# Patient Record
Sex: FEMALE | HISPANIC OR LATINO | Employment: OTHER | URBAN - METROPOLITAN AREA
[De-identification: names, ages, dates, MRNs, and addresses within clinical notes are randomized per-mention and may not be internally consistent; named-entity substitution may affect disease eponyms.]

---

## 2018-01-11 NOTE — MISCELLANEOUS
Message   Recorded as Task   Date: 12/04/2016 07:13 PM, Created By: Maddi Barber   Task Name: Call Patient with results   Assigned To: Maddi Barber   Regarding Patient: Gwendolyn Velasquez, Status: Complete   Comment:    Maddi Barber - 04 Dec 2016 7:13 PM     Patient Phone: [phone]    looks ok, benign cyst   Sun Mess - 08 Dec 2016 3:36 PM     TASK COMPLETED   Advised pt's son of results, stated below, per Dr Milo wren      Active Problems    1  Anxiety (300 00) (F41 9)   2  Bursitis (727 3) (M71 9)   3  Dementia without behavioral disturbance (294 20) (F03 90)   4  Depression (311) (F32 9)   5  Depression with anxiety (300 4) (F41 8)   6  Gallbladder disease (575 9) (K82 9)   7  Generalized osteoarthritis (715 00) (M15 9)   8  Hip pain, unspecified laterality   9  Idiopathic osteoporosis (733 02) (M81 8)   10  Insomnia (780 52) (G47 00)   11  Insomnia (780 52) (G47 00)   12  Kyphoscoliosis (737 30) (M41 9)   13  Lower back pain (724 2) (M54 5)   14  Lower back pain (724 2) (M54 5)   15  Osteoporosis (733 00) (M81 0)   16  Sebaceous cyst (706 2) (L72 3)   17  Subdural hematoma (432 1) (I62 00)   18  Trochanteric bursitis, unspecified laterality (726 5) (M70 60)   19  Urinary tract infection (599 0) (N39 0)    Current Meds   1  Alendronate Sodium 35 MG Oral Tablet; TAKE 1 TABLET WEEKLY ON AN EMPTY   STOMACH 30-60 MINUTES BEFORE BREAKFAST WITH 8-12 OUNCES OF WATER  DO NOT LIE DOWN AFTER TAKING PILL; Therapy: 02Xlt0388 to (Last Rx:21Rfa9691)  Requested for: 23Xxy9551 Ordered   2  Donepezil HCl - 5 MG Oral Tablet; TAKE 1 TABLET DAILY AS DIRECTED; Therapy: 59Qhf7646 to (Evaluate:80Vie0903)  Requested for: 28Fft1048; Last   Rx:84Gpc3721 Ordered   3  Flurazepam HCl - 15 MG Oral Capsule; TAKE 1 CAPSULE AT BEDTIME AS NEEDED; Therapy: 28WXK7737 to (Evaluate:36Ncf4113); Last Rx:13Mar2013 Ordered   4   Hydrocodone-Acetaminophen 5-500 MG Oral Tablet; TAKE 1 TABLET TWICE DAILY AS   NEEDED FOR PAIN; Therapy: 73DKJ1424 to (Ferdinand Van)  Requested for: 16DJJ3831; Last   Rx:30Nov2012 Ordered   5  Neurontin 100 MG Oral Capsule (Gabapentin); Therapy: (Juventino Ferrell) to Recorded   6  Zolpidem Tartrate 5 MG Oral Tablet; TAKE 1 TABLET AT  BEDTIME AS NEEDED FOR   INSOMNIA; Therapy: 15Apr2013 to (Evaluate:14Jun2013); Last Rx:15Apr2013 Ordered    Allergies    1  Codeine Derivatives   2   Codeine Sulfate TABS    Signatures   Electronically signed by : Whitney Ornelas, ; Dec  9 2016  2:38PM EST                       (Author)

## 2018-01-15 NOTE — RESULT NOTES
Verified Results  (1) TISSUE EXAM [id number] 02:30PM Rodney Rojo     Test Name Result Flag Reference   LAB AP CASE REPORT (Report)     Surgical Pathology Report             Case: [id number]                   Authorizing Provider: Nena Cardenas MD    Collected:      11/23/2016 1430        Pathologist:      Sherin Lees MD   Received:      11/25/2016 5358        Specimen:  Skin, Cyst/Tag/Debridement, Sebaceous cyst on right chin   LAB AP FINAL DIAGNOSIS      A  Skin, right chin, excision:  - Epithelial lined keratin filled cyst, consistent with benign epidermal   inclusion cyst     Electronically signed by Sherin Lees MD on 11/29/2016 at 12:52 PM   LAB AP SURGICAL ADDITIONAL INFORMATION (Report)     These tests were developed and their performance characteristics   determined by [facility] or [facility]  They may not be cleared or approved by the [place]  Food and   Drug Administration  The FDA has determined that such clearance or   approval is not necessary  These tests are used for clinical purposes  They should not be regarded as investigational or for research  This   laboratory has been approved by [place] 88, designated as a high-complexity   laboratory and is qualified to perform these tests  Interpretation performed at [facility], [address] [place] [address], [id number]   LAB AP GROSS DESCRIPTION (Report)     A  The specimen is received in formalin, labeled with the patient's name   and hospital number, and is designated sebaceous cyst on right chin  The specimen consists of a tan yellow fibromembranous and fibrofatty   tissue fragment measuring 1 5 cm  On one surface is unremarkable tan   fragment of skin measuring 1 2 cm  The specimen is bisected lengthwise   revealing a cystic structure within the soft tissue filled with tan-brown   caseous material  Entirely submitted  One cassette      Note: The estimated total formalin fixation time based upon information   provided by the submitting clinician and the standard processing schedule   is 54 0 hours      MAC

## 2018-01-15 NOTE — MISCELLANEOUS
Message   Recorded as Task   Date: 12/04/2016 07:13 PM, Created By: Brynn Anders   Task Name: Call Patient with results   Assigned To: Brynn Anders   Regarding Patient: Alcus Dance, Status: Active   Comment:    Brynn Anders - 04 Dec 2016 7:13 PM     Patient Phone: [phone]    looks ok, benign cyst   Left message r/c/b  Active Problems    1  Anxiety (300 00) (F41 9)   2  Bursitis (727 3) (M71 9)   3  Dementia without behavioral disturbance (294 20) (F03 90)   4  Depression (311) (F32 9)   5  Depression with anxiety (300 4) (F41 8)   6  Gallbladder disease (575 9) (K82 9)   7  Generalized osteoarthritis (715 00) (M15 9)   8  Hip pain, unspecified laterality   9  Idiopathic osteoporosis (733 02) (M81 8)   10  Insomnia (780 52) (G47 00)   11  Insomnia (780 52) (G47 00)   12  Kyphoscoliosis (737 30) (M41 9)   13  Lower back pain (724 2) (M54 5)   14  Lower back pain (724 2) (M54 5)   15  Osteoporosis (733 00) (M81 0)   16  Sebaceous cyst (706 2) (L72 3)   17  Subdural hematoma (432 1) (I62 00)   18  Trochanteric bursitis, unspecified laterality (726 5) (M70 60)   19  Urinary tract infection (599 0) (N39 0)    Current Meds   1  Alendronate Sodium 35 MG Oral Tablet; TAKE 1 TABLET WEEKLY ON AN EMPTY   STOMACH 30-60 MINUTES BEFORE BREAKFAST WITH 8-12 OUNCES OF WATER  DO NOT LIE DOWN AFTER TAKING PILL; Therapy: 44Grc7096 to (Last Rx:69Fhm1034)  Requested for: 54Hwl1369 Ordered   2  Donepezil HCl - 5 MG Oral Tablet; TAKE 1 TABLET DAILY AS DIRECTED; Therapy: 51Cdn5094 to (Evaluate:14Jun2013)  Requested for: 53Zpe2345; Last   Rx:65Tbw1190 Ordered   3  Flurazepam HCl - 15 MG Oral Capsule; TAKE 1 CAPSULE AT BEDTIME AS NEEDED; Therapy: 48QXO9107 to (Evaluate:98Wit3642); Last Rx:13Mar2013 Ordered   4  Hydrocodone-Acetaminophen 5-500 MG Oral Tablet; TAKE 1 TABLET TWICE DAILY AS   NEEDED FOR PAIN;   Therapy: 44QHE3046 to (Haritha Pollard)  Requested for: 07XDP8339; Last   Rx:30Nov2012 Ordered   5  Neurontin 100 MG Oral Capsule; Therapy: (Prieto Counter) to Recorded   6  Zolpidem Tartrate 5 MG Oral Tablet; TAKE 1 TABLET AT  BEDTIME AS NEEDED FOR   INSOMNIA; Therapy: 15Apr2013 to (Evaluate:14Jun2013); Last Rx:15Apr2013 Ordered    Allergies    1  Codeine Derivatives   2   Codeine Sulfate TABS    Signatures   Electronically signed by : Billy Liang, ; Dec  8 2016  3:34PM EST                       (Author)

## 2018-10-09 ENCOUNTER — TRANSCRIBE ORDERS (OUTPATIENT)
Dept: ADMINISTRATIVE | Facility: HOSPITAL | Age: 83
End: 2018-10-09

## 2018-10-09 DIAGNOSIS — I62.00 SUBDURAL BLEEDING (HCC): Primary | ICD-10-CM

## 2018-10-09 DIAGNOSIS — R51.9 NONINTRACTABLE HEADACHE, UNSPECIFIED CHRONICITY PATTERN, UNSPECIFIED HEADACHE TYPE: ICD-10-CM

## 2018-10-19 ENCOUNTER — HOSPITAL ENCOUNTER (OUTPATIENT)
Dept: RADIOLOGY | Age: 83
Discharge: HOME | End: 2018-10-19
Payer: MEDICARE

## 2018-10-19 DIAGNOSIS — I62.00 SUBDURAL BLEEDING (HCC): ICD-10-CM

## 2018-10-19 DIAGNOSIS — R51.9 NONINTRACTABLE HEADACHE, UNSPECIFIED CHRONICITY PATTERN, UNSPECIFIED HEADACHE TYPE: ICD-10-CM

## 2018-10-19 PROCEDURE — 70470 CT HEAD/BRAIN W/O & W/DYE: CPT

## 2018-10-19 RX ADMIN — IOHEXOL 100 ML: 350 INJECTION, SOLUTION INTRAVENOUS at 09:24

## 2021-02-04 ENCOUNTER — TRANSCRIBE ORDERS (OUTPATIENT)
Dept: ADMINISTRATIVE | Facility: HOSPITAL | Age: 86
End: 2021-02-04

## 2021-02-04 DIAGNOSIS — M81.0 POST-MENOPAUSAL OSTEOPOROSIS: Primary | ICD-10-CM

## 2021-02-11 ENCOUNTER — HOSPITAL ENCOUNTER (OUTPATIENT)
Dept: BONE DENSITY | Facility: MEDICAL CENTER | Age: 86
Discharge: HOME | End: 2021-02-11
Payer: MEDICARE

## 2021-02-11 DIAGNOSIS — M81.0 POST-MENOPAUSAL OSTEOPOROSIS: ICD-10-CM

## 2021-02-11 PROCEDURE — 77080 DXA BONE DENSITY AXIAL: CPT

## 2021-03-10 DIAGNOSIS — Z23 ENCOUNTER FOR IMMUNIZATION: ICD-10-CM

## 2023-08-07 ENCOUNTER — LAB REQUISITION (OUTPATIENT)
Dept: LAB | Facility: HOSPITAL | Age: 88
End: 2023-08-07
Payer: MEDICARE

## 2023-08-07 DIAGNOSIS — Z00.00 ENCOUNTER FOR GENERAL ADULT MEDICAL EXAMINATION WITHOUT ABNORMAL FINDINGS: ICD-10-CM

## 2023-08-07 LAB
BASOPHILS # BLD AUTO: 0.06 THOUSANDS/ÂΜL (ref 0–0.1)
BASOPHILS NFR BLD AUTO: 1 % (ref 0–1)
EOSINOPHIL # BLD AUTO: 0.34 THOUSAND/ÂΜL (ref 0–0.61)
EOSINOPHIL NFR BLD AUTO: 5 % (ref 0–6)
ERYTHROCYTE [DISTWIDTH] IN BLOOD BY AUTOMATED COUNT: 14.4 % (ref 11.6–15.1)
HCT VFR BLD AUTO: 36.4 % (ref 34.8–46.1)
HGB BLD-MCNC: 11.5 G/DL (ref 11.5–15.4)
IMM GRANULOCYTES # BLD AUTO: 0.03 THOUSAND/UL (ref 0–0.2)
IMM GRANULOCYTES NFR BLD AUTO: 0 % (ref 0–2)
LYMPHOCYTES # BLD AUTO: 1.89 THOUSANDS/ÂΜL (ref 0.6–4.47)
LYMPHOCYTES NFR BLD AUTO: 25 % (ref 14–44)
MCH RBC QN AUTO: 32.3 PG (ref 26.8–34.3)
MCHC RBC AUTO-ENTMCNC: 31.6 G/DL (ref 31.4–37.4)
MCV RBC AUTO: 102 FL (ref 82–98)
MONOCYTES # BLD AUTO: 0.64 THOUSAND/ÂΜL (ref 0.17–1.22)
MONOCYTES NFR BLD AUTO: 8 % (ref 4–12)
NEUTROPHILS # BLD AUTO: 4.66 THOUSANDS/ÂΜL (ref 1.85–7.62)
NEUTS SEG NFR BLD AUTO: 61 % (ref 43–75)
NRBC BLD AUTO-RTO: 0 /100 WBCS
PLATELET # BLD AUTO: 330 THOUSANDS/UL (ref 149–390)
PMV BLD AUTO: 10.3 FL (ref 8.9–12.7)
RBC # BLD AUTO: 3.56 MILLION/UL (ref 3.81–5.12)
WBC # BLD AUTO: 7.62 THOUSAND/UL (ref 4.31–10.16)

## 2023-08-07 PROCEDURE — 84443 ASSAY THYROID STIM HORMONE: CPT | Performed by: FAMILY MEDICINE

## 2023-08-07 PROCEDURE — 85025 COMPLETE CBC W/AUTO DIFF WBC: CPT | Performed by: FAMILY MEDICINE

## 2023-08-07 PROCEDURE — 83036 HEMOGLOBIN GLYCOSYLATED A1C: CPT | Performed by: FAMILY MEDICINE

## 2023-08-07 PROCEDURE — 82306 VITAMIN D 25 HYDROXY: CPT | Performed by: FAMILY MEDICINE

## 2023-08-07 PROCEDURE — 80053 COMPREHEN METABOLIC PANEL: CPT | Performed by: FAMILY MEDICINE

## 2023-08-08 LAB
25(OH)D3 SERPL-MCNC: 47.1 NG/ML (ref 30–100)
ALBUMIN SERPL BCP-MCNC: 3.4 G/DL (ref 3.5–5)
ALP SERPL-CCNC: 128 U/L (ref 46–116)
ALT SERPL W P-5'-P-CCNC: 16 U/L (ref 12–78)
ANION GAP SERPL CALCULATED.3IONS-SCNC: 4 MMOL/L
AST SERPL W P-5'-P-CCNC: 14 U/L (ref 5–45)
BILIRUB SERPL-MCNC: 0.26 MG/DL (ref 0.2–1)
BUN SERPL-MCNC: 26 MG/DL (ref 5–25)
CALCIUM ALBUM COR SERPL-MCNC: 9.1 MG/DL (ref 8.3–10.1)
CALCIUM SERPL-MCNC: 8.6 MG/DL (ref 8.3–10.1)
CHLORIDE SERPL-SCNC: 113 MMOL/L (ref 96–108)
CO2 SERPL-SCNC: 25 MMOL/L (ref 21–32)
CREAT SERPL-MCNC: 0.93 MG/DL (ref 0.6–1.3)
EST. AVERAGE GLUCOSE BLD GHB EST-MCNC: 123 MG/DL
GFR SERPL CREATININE-BSD FRML MDRD: 55 ML/MIN/1.73SQ M
GLUCOSE SERPL-MCNC: 102 MG/DL (ref 65–140)
HBA1C MFR BLD: 5.9 %
POTASSIUM SERPL-SCNC: 3.9 MMOL/L (ref 3.5–5.3)
PROT SERPL-MCNC: 7.4 G/DL (ref 6.4–8.4)
SODIUM SERPL-SCNC: 142 MMOL/L (ref 135–147)
TSH SERPL DL<=0.05 MIU/L-ACNC: 1.68 UIU/ML (ref 0.45–4.5)

## 2023-08-23 ENCOUNTER — HOSPITAL ENCOUNTER (INPATIENT)
Facility: HOSPITAL | Age: 88
LOS: 2 days | Discharge: HOME HEALTH | End: 2023-08-25
Attending: EMERGENCY MEDICINE
Payer: MEDICARE

## 2023-08-23 ENCOUNTER — APPOINTMENT (EMERGENCY)
Dept: RADIOLOGY | Facility: HOSPITAL | Age: 88
End: 2023-08-23
Payer: MEDICARE

## 2023-08-23 ENCOUNTER — APPOINTMENT (EMERGENCY)
Dept: CT IMAGING | Facility: HOSPITAL | Age: 88
End: 2023-08-23
Payer: MEDICARE

## 2023-08-23 DIAGNOSIS — M25.561 BILATERAL KNEE PAIN: ICD-10-CM

## 2023-08-23 DIAGNOSIS — F03.90 DEMENTIA (HCC): ICD-10-CM

## 2023-08-23 DIAGNOSIS — N39.0 UTI (URINARY TRACT INFECTION): ICD-10-CM
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
## 2023-08-23 DIAGNOSIS — M25.562 BILATERAL KNEE PAIN: ICD-10-CM

## 2023-08-23 DIAGNOSIS — R93.7 ABNORMAL CT SCAN, CERVICAL SPINE: ICD-10-CM

## 2023-08-23 PROBLEM — Z53.1 BLOOD TRANSFUSION DECLINED BECAUSE PATIENT IS JEHOVAH'S WITNESS: Status: ACTIVE | Noted: 2023-08-23

## 2023-08-23 PROBLEM — I10 HYPERTENSION: Status: ACTIVE | Noted: 2023-08-23

## 2023-08-23 PROBLEM — E03.9 HYPOTHYROIDISM: Status: ACTIVE | Noted: 2023-08-23

## 2023-08-23 PROBLEM — R77.8 ELEVATED TROPONIN: Status: ACTIVE | Noted: 2023-08-23

## 2023-08-23 LAB
2HR DELTA HS TROPONIN: 42 NG/L
4HR DELTA HS TROPONIN: 43 NG/L
ALBUMIN SERPL BCP-MCNC: 4.3 G/DL (ref 3.5–5)
ALP SERPL-CCNC: 115 U/L (ref 34–104)
ALT SERPL W P-5'-P-CCNC: 7 U/L (ref 7–52)
AMORPH URATE CRY URNS QL MICRO: ABNORMAL
ANION GAP SERPL CALCULATED.3IONS-SCNC: 8 MMOL/L
AST SERPL W P-5'-P-CCNC: 21 U/L (ref 13–39)
ATRIAL RATE: 65 BPM
BACTERIA UR QL AUTO: ABNORMAL /HPF
BASOPHILS # BLD AUTO: 0.04 THOUSANDS/ÂΜL (ref 0–0.1)
BASOPHILS NFR BLD AUTO: 0 % (ref 0–1)
BILIRUB SERPL-MCNC: 0.42 MG/DL (ref 0.2–1)
BILIRUB UR QL STRIP: NEGATIVE
BUN SERPL-MCNC: 34 MG/DL (ref 5–25)
CALCIUM SERPL-MCNC: 9.4 MG/DL (ref 8.4–10.2)
CARDIAC TROPONIN I PNL SERPL HS: 46 NG/L
CARDIAC TROPONIN I PNL SERPL HS: 88 NG/L
CARDIAC TROPONIN I PNL SERPL HS: 89 NG/L
CHLORIDE SERPL-SCNC: 109 MMOL/L (ref 96–108)
CLARITY UR: ABNORMAL
CO2 SERPL-SCNC: 22 MMOL/L (ref 21–32)
COLOR UR: YELLOW
CREAT SERPL-MCNC: 0.87 MG/DL (ref 0.6–1.3)
EOSINOPHIL # BLD AUTO: 0.03 THOUSAND/ÂΜL (ref 0–0.61)
EOSINOPHIL NFR BLD AUTO: 0 % (ref 0–6)
ERYTHROCYTE [DISTWIDTH] IN BLOOD BY AUTOMATED COUNT: 14.3 % (ref 11.6–15.1)
GFR SERPL CREATININE-BSD FRML MDRD: 60 ML/MIN/1.73SQ M
GLUCOSE SERPL-MCNC: 99 MG/DL (ref 65–140)
GLUCOSE UR STRIP-MCNC: NEGATIVE MG/DL
HCT VFR BLD AUTO: 29.7 % (ref 34.8–46.1)
HGB BLD-MCNC: 9.2 G/DL (ref 11.5–15.4)
HGB UR QL STRIP.AUTO: ABNORMAL
IMM GRANULOCYTES # BLD AUTO: 0.11 THOUSAND/UL (ref 0–0.2)
IMM GRANULOCYTES NFR BLD AUTO: 1 % (ref 0–2)
KETONES UR STRIP-MCNC: NEGATIVE MG/DL
LEUKOCYTE ESTERASE UR QL STRIP: NEGATIVE
LYMPHOCYTES # BLD AUTO: 1.07 THOUSANDS/ÂΜL (ref 0.6–4.47)
LYMPHOCYTES NFR BLD AUTO: 6 % (ref 14–44)
MCH RBC QN AUTO: 31.8 PG (ref 26.8–34.3)
MCHC RBC AUTO-ENTMCNC: 31 G/DL (ref 31.4–37.4)
MCV RBC AUTO: 103 FL (ref 82–98)
MONOCYTES # BLD AUTO: 0.88 THOUSAND/ÂΜL (ref 0.17–1.22)
MONOCYTES NFR BLD AUTO: 5 % (ref 4–12)
MUCOUS THREADS UR QL AUTO: ABNORMAL
NEUTROPHILS # BLD AUTO: 15.1 THOUSANDS/ÂΜL (ref 1.85–7.62)
NEUTS SEG NFR BLD AUTO: 88 % (ref 43–75)
NITRITE UR QL STRIP: POSITIVE
NON-SQ EPI CELLS URNS QL MICRO: ABNORMAL /HPF
NRBC BLD AUTO-RTO: 0 /100 WBCS
P AXIS: 48 DEGREES
P AXIS: 54 DEGREES
P AXIS: 66 DEGREES
PH UR STRIP.AUTO: 5.5 [PH] (ref 4.5–8)
PLATELET # BLD AUTO: 386 THOUSANDS/UL (ref 149–390)
PMV BLD AUTO: 10 FL (ref 8.9–12.7)
POTASSIUM SERPL-SCNC: 4.8 MMOL/L (ref 3.5–5.3)
PR INTERVAL: 160 MS
PR INTERVAL: 164 MS
PR INTERVAL: 170 MS
PROT SERPL-MCNC: 7.7 G/DL (ref 6.4–8.4)
PROT UR STRIP-MCNC: ABNORMAL MG/DL
QRS AXIS: -3 DEGREES
QRS AXIS: 1 DEGREES
QRS AXIS: 10 DEGREES
QRSD INTERVAL: 74 MS
QRSD INTERVAL: 76 MS
QRSD INTERVAL: 78 MS
QT INTERVAL: 422 MS
QT INTERVAL: 432 MS
QT INTERVAL: 438 MS
QTC INTERVAL: 438 MS
QTC INTERVAL: 449 MS
QTC INTERVAL: 455 MS
RBC # BLD AUTO: 2.89 MILLION/UL (ref 3.81–5.12)
RBC #/AREA URNS AUTO: ABNORMAL /HPF
SODIUM SERPL-SCNC: 139 MMOL/L (ref 135–147)
SP GR UR STRIP.AUTO: >=1.03 (ref 1–1.03)
T WAVE AXIS: 24 DEGREES
T WAVE AXIS: 30 DEGREES
T WAVE AXIS: 51 DEGREES
UROBILINOGEN UR QL STRIP.AUTO: 0.2 E.U./DL
VENTRICULAR RATE: 65 BPM
WBC # BLD AUTO: 17.23 THOUSAND/UL (ref 4.31–10.16)
WBC #/AREA URNS AUTO: ABNORMAL /HPF

## 2023-08-23 PROCEDURE — 93005 ELECTROCARDIOGRAM TRACING: CPT

## 2023-08-23 PROCEDURE — 99223 1ST HOSP IP/OBS HIGH 75: CPT | Performed by: STUDENT IN AN ORGANIZED HEALTH CARE EDUCATION/TRAINING PROGRAM

## 2023-08-23 PROCEDURE — 70450 CT HEAD/BRAIN W/O DYE: CPT

## 2023-08-23 PROCEDURE — 96365 THER/PROPH/DIAG IV INF INIT: CPT

## 2023-08-23 PROCEDURE — G1004 CDSM NDSC: HCPCS

## 2023-08-23 PROCEDURE — 85025 COMPLETE CBC W/AUTO DIFF WBC: CPT

## 2023-08-23 PROCEDURE — 71260 CT THORAX DX C+: CPT

## 2023-08-23 PROCEDURE — 93010 ELECTROCARDIOGRAM REPORT: CPT | Performed by: INTERNAL MEDICINE

## 2023-08-23 PROCEDURE — 72125 CT NECK SPINE W/O DYE: CPT

## 2023-08-23 PROCEDURE — 87186 SC STD MICRODIL/AGAR DIL: CPT | Performed by: STUDENT IN AN ORGANIZED HEALTH CARE EDUCATION/TRAINING PROGRAM

## 2023-08-23 PROCEDURE — 93010 ELECTROCARDIOGRAM REPORT: CPT | Performed by: STUDENT IN AN ORGANIZED HEALTH CARE EDUCATION/TRAINING PROGRAM

## 2023-08-23 PROCEDURE — 1124F ACP DISCUSS-NO DSCNMKR DOCD: CPT | Performed by: INTERNAL MEDICINE

## 2023-08-23 PROCEDURE — 80053 COMPREHEN METABOLIC PANEL: CPT

## 2023-08-23 PROCEDURE — 99285 EMERGENCY DEPT VISIT HI MDM: CPT

## 2023-08-23 PROCEDURE — 36415 COLL VENOUS BLD VENIPUNCTURE: CPT

## 2023-08-23 PROCEDURE — 87077 CULTURE AEROBIC IDENTIFY: CPT | Performed by: STUDENT IN AN ORGANIZED HEALTH CARE EDUCATION/TRAINING PROGRAM

## 2023-08-23 PROCEDURE — 74177 CT ABD & PELVIS W/CONTRAST: CPT

## 2023-08-23 PROCEDURE — 81001 URINALYSIS AUTO W/SCOPE: CPT

## 2023-08-23 PROCEDURE — 87086 URINE CULTURE/COLONY COUNT: CPT | Performed by: STUDENT IN AN ORGANIZED HEALTH CARE EDUCATION/TRAINING PROGRAM

## 2023-08-23 PROCEDURE — 73564 X-RAY EXAM KNEE 4 OR MORE: CPT

## 2023-08-23 PROCEDURE — 84484 ASSAY OF TROPONIN QUANT: CPT

## 2023-08-23 RX ORDER — CEPHALEXIN 500 MG/1
500 CAPSULE ORAL 2 TIMES DAILY
Qty: 14 CAPSULE | Refills: 0 | Status: SHIPPED | OUTPATIENT
Start: 2023-08-23 | End: 2023-08-23 | Stop reason: CLARIF

## 2023-08-23 RX ORDER — ACETAMINOPHEN 325 MG/1
975 TABLET ORAL ONCE
Status: COMPLETED | OUTPATIENT
Start: 2023-08-23 | End: 2023-08-23

## 2023-08-23 RX ORDER — ONDANSETRON 2 MG/ML
4 INJECTION INTRAMUSCULAR; INTRAVENOUS EVERY 6 HOURS PRN
Status: DISCONTINUED | OUTPATIENT
Start: 2023-08-23 | End: 2023-08-25 | Stop reason: HOSPADM

## 2023-08-23 RX ORDER — LANOLIN ALCOHOL/MO/W.PET/CERES
1 CREAM (GRAM) TOPICAL 2 TIMES DAILY
COMMUNITY

## 2023-08-23 RX ORDER — ENOXAPARIN SODIUM 100 MG/ML
40 INJECTION SUBCUTANEOUS DAILY
Status: DISCONTINUED | OUTPATIENT
Start: 2023-08-24 | End: 2023-08-25 | Stop reason: HOSPADM

## 2023-08-23 RX ORDER — CEFTRIAXONE 1 G/50ML
1000 INJECTION, SOLUTION INTRAVENOUS EVERY 24 HOURS
Status: DISCONTINUED | OUTPATIENT
Start: 2023-08-24 | End: 2023-08-25 | Stop reason: HOSPADM

## 2023-08-23 RX ORDER — HYDRALAZINE HYDROCHLORIDE 20 MG/ML
10 INJECTION INTRAMUSCULAR; INTRAVENOUS EVERY 4 HOURS PRN
Status: DISCONTINUED | OUTPATIENT
Start: 2023-08-23 | End: 2023-08-25 | Stop reason: HOSPADM

## 2023-08-23 RX ORDER — SODIUM CHLORIDE, SODIUM GLUCONATE, SODIUM ACETATE, POTASSIUM CHLORIDE, MAGNESIUM CHLORIDE, SODIUM PHOSPHATE, DIBASIC, AND POTASSIUM PHOSPHATE .53; .5; .37; .037; .03; .012; .00082 G/100ML; G/100ML; G/100ML; G/100ML; G/100ML; G/100ML; G/100ML
75 INJECTION, SOLUTION INTRAVENOUS CONTINUOUS
Status: DISCONTINUED | OUTPATIENT
Start: 2023-08-23 | End: 2023-08-24

## 2023-08-23 RX ORDER — ACETAMINOPHEN 500 MG
500 TABLET ORAL 3 TIMES DAILY
COMMUNITY

## 2023-08-23 RX ORDER — OLANZAPINE 5 MG/1
5 TABLET ORAL EVERY 6 HOURS PRN
Status: DISCONTINUED | OUTPATIENT
Start: 2023-08-23 | End: 2023-08-24

## 2023-08-23 RX ORDER — LEVOTHYROXINE SODIUM 0.03 MG/1
25 TABLET ORAL
Status: DISCONTINUED | OUTPATIENT
Start: 2023-08-24 | End: 2023-08-25 | Stop reason: HOSPADM

## 2023-08-23 RX ORDER — CEFTRIAXONE 1 G/50ML
1000 INJECTION, SOLUTION INTRAVENOUS ONCE
Status: COMPLETED | OUTPATIENT
Start: 2023-08-23 | End: 2023-08-23

## 2023-08-23 RX ORDER — ASPIRIN 81 MG/1
81 TABLET, CHEWABLE ORAL DAILY
COMMUNITY

## 2023-08-23 RX ORDER — CITALOPRAM 20 MG/1
20 TABLET ORAL DAILY
COMMUNITY

## 2023-08-23 RX ORDER — CITALOPRAM 20 MG/1
20 TABLET ORAL DAILY
Status: DISCONTINUED | OUTPATIENT
Start: 2023-08-24 | End: 2023-08-25 | Stop reason: HOSPADM

## 2023-08-23 RX ORDER — LEVOTHYROXINE SODIUM 0.03 MG/1
25 TABLET ORAL DAILY
COMMUNITY

## 2023-08-23 RX ORDER — ACETAMINOPHEN 325 MG/1
650 TABLET ORAL EVERY 6 HOURS PRN
Status: DISCONTINUED | OUTPATIENT
Start: 2023-08-23 | End: 2023-08-24

## 2023-08-23 RX ORDER — ASPIRIN 81 MG/1
81 TABLET, CHEWABLE ORAL DAILY
Status: DISCONTINUED | OUTPATIENT
Start: 2023-08-24 | End: 2023-08-25 | Stop reason: HOSPADM

## 2023-08-23 RX ORDER — QUETIAPINE FUMARATE 25 MG/1
12.5 TABLET, FILM COATED ORAL
Status: DISCONTINUED | OUTPATIENT
Start: 2023-08-23 | End: 2023-08-24

## 2023-08-23 RX ORDER — MENTHOL 100 MG/G
CREAM TOPICAL EVERY 4 HOURS
COMMUNITY

## 2023-08-23 RX ADMIN — CEFTRIAXONE 1000 MG: 1 INJECTION, SOLUTION INTRAVENOUS at 14:48

## 2023-08-23 RX ADMIN — SODIUM CHLORIDE, SODIUM GLUCONATE, SODIUM ACETATE, POTASSIUM CHLORIDE, MAGNESIUM CHLORIDE, SODIUM PHOSPHATE, DIBASIC, AND POTASSIUM PHOSPHATE 75 ML/HR: .53; .5; .37; .037; .03; .012; .00082 INJECTION, SOLUTION INTRAVENOUS at 18:53

## 2023-08-23 RX ADMIN — ACETAMINOPHEN 975 MG: 325 TABLET ORAL at 11:22

## 2023-08-23 RX ADMIN — QUETIAPINE FUMARATE 12.5 MG: 25 TABLET ORAL at 21:01

## 2023-08-23 RX ADMIN — IOHEXOL 100 ML: 350 INJECTION, SOLUTION INTRAVENOUS at 12:27

## 2023-08-23 NOTE — ASSESSMENT & PLAN NOTE
History of dementia [facility]  Continue Celexa 20 mg once daily  As patient appears to be agitated, removing IV lines and trying to get up with recent fall.   Start Seroquel 12.5 mg bedtime, add Zyprexa as needed for increased agitation  Fall precautions  Geriatrics consulted

## 2023-08-23 NOTE — H&P
[facility]  H&P  Name: Edelmira Boogie 81 y.o. female I MRN: [id number]  Unit/Bed#: ED-05 I Date of Admission: 8/23/2023   Date of Service: 8/23/2023 I Hospital Day: 0      Assessment/Plan   Elevated troponin  Assessment & Plan  Elevated troponins, 46, 88  Currently denying any chest pain or shortness of breath  EKG showing normal sinus rhythm without ischemic changes  Suspect likely due to recent fall    UTI (urinary tract infection)  Assessment & Plan  UA concerning for UTI with hx of falls  Noted to be foul smelling  Urine cx order, continue rocephin    Dementia ([address])  Assessment & Plan  History of dementia [facility]  Continue Celexa 20 mg once daily  As patient appears to be agitated, removing IV lines and trying to get up with recent fall. Start Seroquel 12.5 mg bedtime, add Zyprexa as needed for increased agitation  Fall precautions  Geriatrics consulted    Hypothyroidism  Assessment & Plan  Continue levothyroxine    Hypertension  Assessment & Plan  Not on medications PTA  Elevated, possibly due to increase agitation  Monitor at this time, PRN hydralyzine    * Fall  Assessment & Plan  81year old female with PMH of hypothyroidism, dementia, and HTN presented with falls  CT head negative for acute process, CT C-spine with mild C5 anterior wedge deformity. ED discussed with trauma, does not require c-collar, or transfer to B.   Patient without neck pain  CT chest abdomen pelvis without acute process  Patient is not a good historian, with history of dementia and possibly secondary  Leukocytosis 17 on admission, UA concerning for infection  Continue IV Rocephin, await urine cultures  PT and OT to evaluate       VTE Prophylaxis: Enoxaparin (Lovenox)  / sequential compression device   Code Status: FC  POLST: There is no POLST form on file for this patient (pre-hospital)    Anticipated Length of Stay:  Patient will be admitted on an Inpatient basis with an anticipated length of stay of   2 midnights. Justification for Hospital Stay: IV abx, await cultures, PT/OT eval    Chief Complaint:   Fall    History of Present Illness:    Atilio Coburn is a 81 y.o. female who presents with fall. PMH of hypothyroidism, dementia, HTN and Jehovah's witness. Presented with unwitnessed fall [facility]. Patient is currently demented, unable to answer questions appropriately. Patient was pan scanned, CT head without acute process, CT C-spine showing degenerative changes, C5 anterior wedge deformity. Discussed with trauma, does not require c-collar or transfer to [facility]. UA concerning for possible infection. Called and updated son, confirmed patient is full code. Review of Systems:    Review of Systems   Unable to perform ROS: Dementia       Past Medical and Surgical History:     Past Medical History:   Diagnosis Date   • Cataracts, bilateral    • Dementia ([address])    • HTN (hypertension)    • Hypothyroidism    • Insomnia    • Osteoporosis        History reviewed. No pertinent surgical history. Meds/Allergies:    Prior to Admission medications    Medication Sig Start Date End Date Taking?  Authorizing Provider   acetaminophen (TYLENOL) 500 mg tablet Take 500 mg by mouth 3 (three) times a day   Yes Historical Provider, MD   aspirin 81 mg chewable tablet Chew 81 mg daily   Yes Historical Provider, MD   calcium citrate-vitamin D 315 mg-5 mcg tablet Take 1 tablet by mouth 2 (two) times a day   Yes Historical Provider, MD   citalopram (CeleXA) 20 mg tablet Take 20 mg by mouth daily   Yes Historical Provider, MD   levothyroxine 25 mcg tablet Take 25 mcg by mouth daily   Yes Historical Provider, MD   Menthol, Topical Analgesic, (Biofreeze) 10 % CREA Apply topically every 4 (four) hours   Yes Historical Provider, MD   Nutritional Supplements (Ensure Plus High Protein) LIQD Take by mouth in the morning   Yes Historical Provider, MD   cephalexin (KEFLEX) 500 mg capsule Take 1 capsule (500 mg total) by mouth 2 (two) times a day for 7 days 8/23/23 8/23/23 Yes Adela Bonilla PA-C     I have reviewed home medications with patient personally. Allergies: Not on File    Social History:     Marital Status:    Occupation:   Patient Pre-hospital Living Situation: [facility]  Patient Pre-hospital Level of Mobility: amb  Patient Pre-hospital Diet Restrictions: none  Substance Use History:   Social History     Substance and Sexual Activity   Alcohol Use Never     Social History     Tobacco Use   Smoking Status Unknown   Smokeless Tobacco Not on file     Social History     Substance and Sexual Activity   Drug Use Never       Family History:    History reviewed. No pertinent family history. Physical Exam:     Vitals:   Blood Pressure: (!) 173/80 (08/23/23 1629)  Pulse: 77 (08/23/23 1629)  Temperature: (!) 97.1 °F (36.2 °C) (08/23/23 1250)  Temp Source: Oral (08/23/23 1250)  Respirations: 18 (08/23/23 1629)  Weight - Scale: 60.5 kg (133 lb 6.1 oz) (08/23/23 1022)  SpO2: 93 % (08/23/23 1629)    Physical Exam  Vitals and nursing note reviewed. Constitutional:       Appearance: She is normal weight. HENT:      Head: Normocephalic. Eyes:      Conjunctiva/sclera: Conjunctivae normal.   Cardiovascular:      Rate and Rhythm: Normal rate. Pulmonary:      Effort: Pulmonary effort is normal. No respiratory distress. Abdominal:      General: Bowel sounds are normal.      Palpations: Abdomen is soft. Musculoskeletal:         General: No swelling. Right lower leg: No edema. Left lower leg: No edema. Skin:     General: Skin is warm. Neurological:      Mental Status: She is disoriented. Psychiatric:         Mood and Affect: Mood normal.         Additional Data:     Lab Results: I have personally reviewed pertinent reports.       Results from last 7 days   Lab Units 08/23/23  1121   WBC Thousand/uL 17.23*   HEMOGLOBIN g/dL 9.2*   HEMATOCRIT % 29.7* PLATELETS Thousands/uL 386   NEUTROS PCT % 88*   LYMPHS PCT % 6*   MONOS PCT % 5   EOS PCT % 0     Results from last 7 days   Lab Units 08/23/23  1121   SODIUM mmol/L 139   POTASSIUM mmol/L 4.8   CHLORIDE mmol/L 109*   CO2 mmol/L 22   BUN mg/dL 34*   CREATININE mg/dL 0.87   ANION GAP mmol/L 8   CALCIUM mg/dL 9.4   ALBUMIN g/dL 4.3   TOTAL BILIRUBIN mg/dL 0.42   ALK PHOS U/L 115*   ALT U/L 7   AST U/L 21   GLUCOSE RANDOM mg/dL 99                     Imaging: I have personally reviewed pertinent reports. CT chest abdomen pelvis w contrast   Final Result by Yogesh James MD (08/23 1401)      No acute fracture identified. No acute intrathoracic or intra-abdominal pathology. Mild hypoventilatory changes in the lungs. Chronic findings, as per the body of the report. Workstation performed: [id number]         CT head without contrast   Final Result by Yogesh James MD (08/23 1337)      No acute intracranial abnormality. Chronic findings, as above. Workstation performed: [id number]         CT spine cervical without contrast   Final Result by Yogesh James MD (08/23 1343)   Mild anterior wedge deformity of C5 without other suspicious findings, possibly chronic. Correlate to exclude focal tenderness. Otherwise no fracture suspected. Advanced multilevel degenerative changes. Grade 1 anterolisthesis of C7 on T1, likely degenerative in etiology. Workstation performed: [id number]         XR knee 4+ views left injury   Final Result by Marina Bone MD (08/23 1131)      No acute osseous abnormality. Workstation performed: [id number]         XR knee 4+ views Right injury   Final Result by Marina Bone MD (08/23 1132)      No acute osseous abnormality.             Workstation performed: [id number]             EKG, Pathology, and Other Studies Reviewed on Admission:   · EKG: NSR    Allscripts / Epic Records Reviewed: Yes     ** Please Note: This note has been constructed using a voice recognition system.  **

## 2023-08-23 NOTE — ASSESSMENT & PLAN NOTE
81year old female with PMH of hypothyroidism, dementia, and HTN presented with falls  CT head negative for acute process, CT C-spine with mild C5 anterior wedge deformity. ED discussed with trauma, does not require c-collar, or transfer to [facility].   Patient without neck pain  CT chest abdomen pelvis without acute process  Patient is not a good historian, with history of dementia and possibly secondary  Leukocytosis 17 on admission, UA concerning for infection  Continue IV Rocephin, await urine cultures  PT and OT to evaluate

## 2023-08-23 NOTE — ED PROVIDER NOTES
History  Chief Complaint   Patient presents with   • Fall     Pt arriving from facility via ems due to an unwitnessed fall. Abrasions noted to bilateral knees. -thinners     Patient is an 81year old female with a history of dementia, HTN, hypothyroidism, osteoporosis presenting via EMS for evaluation of an unwitnessed fall. She is complaining of bilateral knee pain and abrasions. Also has left sided neck pain and chest pain. States she felt well before the fall, possibly mild lightheadedness but she is unsure. She cannot recall the mechanism of the fall. Unknown head strike, LOC. Denies visual changes, tinnitus, unilateral weakness, numbness, tingling. She denied back pain to myself, however told the nurse that she has low back pain. No medications prior to arrival. Does not take thinners. Prior to Admission Medications   Prescriptions Last Dose Informant Patient Reported? Taking? Menthol, Topical Analgesic, (Biofreeze) 10 % CREA   Yes Yes   Sig: Apply topically every 4 (four) hours   Nutritional Supplements (Ensure Plus High Protein) LIQD   Yes Yes   Sig: Take by mouth in the morning   acetaminophen (TYLENOL) 500 mg tablet   Yes Yes   Sig: Take 500 mg by mouth 3 (three) times a day   aspirin 81 mg chewable tablet   Yes Yes   Sig: Chew 81 mg daily   calcium citrate-vitamin D 315 mg-5 mcg tablet   Yes Yes   Sig: Take 1 tablet by mouth 2 (two) times a day   citalopram (CeleXA) 20 mg tablet   Yes Yes   Sig: Take 20 mg by mouth daily   levothyroxine 25 mcg tablet   Yes Yes   Sig: Take 25 mcg by mouth daily      Facility-Administered Medications: None       Past Medical History:   Diagnosis Date   • Cataracts, bilateral    • Dementia (HCC)    • HTN (hypertension)    • Hypothyroidism    • Insomnia    • Osteoporosis        History reviewed. No pertinent surgical history. History reviewed. No pertinent family history. I have reviewed and agree with the history as documented.     E-Cigarette/Vaping E-Cigarette/Vaping Substances     Social History     Tobacco Use   • Smoking status: Unknown   Substance Use Topics   • Alcohol use: Never   • Drug use: Never       Review of Systems   Constitutional: Negative for chills and fever. HENT: Negative for congestion, ear pain and sore throat. Eyes: Negative for photophobia, pain and visual disturbance. Respiratory: Negative for cough and shortness of breath. Cardiovascular: Positive for chest pain. Negative for palpitations and leg swelling. Gastrointestinal: Negative for abdominal pain, diarrhea, nausea and vomiting. Genitourinary: Negative for dysuria, flank pain and hematuria. Musculoskeletal: Positive for neck pain. Negative for arthralgias, back pain, joint swelling and neck stiffness. Skin: Positive for wound. Negative for color change and rash. Neurological: Positive for light-headedness. Negative for dizziness, seizures, syncope, facial asymmetry, weakness and headaches. All other systems reviewed and are negative. Physical Exam  Physical Exam  Vitals and nursing note reviewed. Constitutional:       General: She is awake. She is not in acute distress. Appearance: Normal appearance. HENT:      Head: Normocephalic and atraumatic. No raccoon eyes, Godinez's sign or laceration. Jaw: There is normal jaw occlusion. No pain on movement. Right Ear: Tympanic membrane and external ear normal. No hemotympanum. Left Ear: Tympanic membrane and external ear normal. No hemotympanum. Nose: Nose normal. No signs of injury. Mouth/Throat:      Mouth: Mucous membranes are moist. No injury. Tongue: No lesions. Tongue does not deviate from midline. Pharynx: Uvula midline. No posterior oropharyngeal erythema. Eyes:      General: Gaze aligned appropriately. No scleral icterus. Right eye: No discharge. Left eye: No discharge.    Neck:     Cardiovascular:      Rate and Rhythm: Normal rate and regular rhythm. Pulses: Normal pulses. Heart sounds: Normal heart sounds. Pulmonary:      Effort: Pulmonary effort is normal. No respiratory distress. Breath sounds: Normal breath sounds. No decreased breath sounds, wheezing, rhonchi or rales. Chest:      Chest wall: Tenderness present. No mass, deformity or crepitus. Comments: No chest wall deformities, ecchymosis, erythema, open wounds. Abdominal:      Palpations: Abdomen is soft. Tenderness: There is generalized abdominal tenderness. There is no right CVA tenderness, left CVA tenderness, guarding or rebound. Musculoskeletal:         General: No deformity or signs of injury. Cervical back: Normal range of motion and neck supple. Tenderness present. Muscular tenderness present. No pain with movement or spinous process tenderness. Thoracic back: Normal.      Lumbar back: Normal.      Right hip: Tenderness present. Left hip: Tenderness present. Right lower leg: No edema. Left lower leg: No edema. Legs:       Comments: No swelling, tenderness, deformities of upper extremities. Tenderness to the left upper trapezius. No midline spine tenderness, step offs, deformities. Mild tenderness to bilateral hips, pelvis stable. No ecchymosis or obvious deformity. Able to bend hips bilaterally. Abrasions over bilateral knees with tenderness. Normal AROM. No laxity. Skin:     General: Skin is dry. Coloration: Skin is not jaundiced. Findings: No erythema or rash. Neurological:      General: No focal deficit present. Mental Status: She is alert. Mental status is at baseline. GCS: GCS eye subscore is 4. GCS verbal subscore is 4. GCS motor subscore is 6. Cranial Nerves: Cranial nerves 2-12 are intact. No cranial nerve deficit, dysarthria or facial asymmetry. Sensory: Sensation is intact. Motor: No weakness.       Gait: Gait normal.   Psychiatric:         Mood and Affect: Mood normal. Behavior: Behavior normal.         Thought Content:  Thought content normal.         Vital Signs  ED Triage Vitals   Temperature Pulse Respirations Blood Pressure SpO2   08/23/23 1022 08/23/23 1022 08/23/23 1022 08/23/23 1022 08/23/23 1022   97.6 °F (36.4 °C) 64 18 146/65 96 %      Temp Source Heart Rate Source Patient Position - Orthostatic VS BP Location FiO2 (%)   08/23/23 1022 08/23/23 1022 08/23/23 1022 08/23/23 1022 --   Oral Monitor Lying Right arm       Pain Score       08/23/23 1211       No Pain           Vitals:    08/23/23 1022 08/23/23 1250 08/23/23 1513   BP: 146/65 134/64 (!) 189/90   Pulse: 64 69 74   Patient Position - Orthostatic VS: Lying Lying Lying         Visual Acuity      ED Medications  Medications   acetaminophen (TYLENOL) tablet 975 mg (975 mg Oral Given 8/23/23 1122)   iohexol (OMNIPAQUE) 350 MG/ML injection (SINGLE-DOSE) 100 mL (100 mL Intravenous Given 8/23/23 1227)   cefTRIAXone (ROCEPHIN) IVPB (premix in dextrose) 1,000 mg 50 mL (0 mg Intravenous Stopped 8/23/23 1518)       Diagnostic Studies  Results Reviewed     Procedure Component Value Units Date/Time    HS Troponin I 2hr [[id number]]  (Abnormal) Collected: 08/23/23 1509    Lab Status: Final result Specimen: Blood from Arm, Right Updated: 08/23/23 1544     hs TnI 2hr 88 ng/L      Delta 2hr hsTnI 42 ng/L     Urine Microscopic [[id number]]  (Abnormal) Collected: 08/23/23 1208    Lab Status: Final result Specimen: Urine, Other Updated: 08/23/23 1303     RBC, UA 2-4 /hpf      WBC, UA 2-4 /hpf      Epithelial Cells Occasional /hpf      Bacteria, UA Innumerable /hpf      MUCUS THREADS Innumerable     Amorphous Crystals, UA Occasional    Urine Macroscopic, POC [[id number]]  (Abnormal) Collected: 08/23/23 1208    Lab Status: Final result Specimen: Urine Updated: 08/23/23 1210     Color, UA Yellow     Clarity, UA Slightly Cloudy     pH, UA 5.5     Leukocytes, UA Negative     Nitrite, UA Positive     Protein, UA Trace mg/dl      Glucose, UA Negative mg/dl      Ketones, UA Negative mg/dl      Urobilinogen, UA 0.2 E.U./dl      Bilirubin, UA Negative     Occult Blood, UA Small     Specific Gravity, UA >=1.030    Narrative:      CLINITEK RESULT    HS Troponin I 4hr [[id number]]     Lab Status: No result Specimen: Blood     HS Troponin 0hr (reflex protocol) [[id number]]  (Normal) Collected: 08/23/23 1121    Lab Status: Final result Specimen: Blood from Arm, Right Updated: 08/23/23 1155     hs TnI 0hr 46 ng/L     Comprehensive metabolic panel [[id number]]  (Abnormal) Collected: 08/23/23 1121    Lab Status: Final result Specimen: Blood from Arm, Right Updated: 08/23/23 1148     Sodium 139 mmol/L      Potassium 4.8 mmol/L      Chloride 109 mmol/L      CO2 22 mmol/L      ANION GAP 8 mmol/L      BUN 34 mg/dL      Creatinine 0.87 mg/dL      Glucose 99 mg/dL      Calcium 9.4 mg/dL      AST 21 U/L      ALT 7 U/L      Alkaline Phosphatase 115 U/L      Total Protein 7.7 g/dL      Albumin 4.3 g/dL      Total Bilirubin 0.42 mg/dL      eGFR 60 ml/min/1.73sq m     Narrative:      [place] guidelines for Chronic Kidney Disease (CKD):   •  Stage 1 with normal or high GFR (GFR > 90 mL/min/1.73 square meters)  •  Stage 2 Mild CKD (GFR = 60-89 mL/min/1.73 square meters)  •  Stage 3A Moderate CKD (GFR = 45-59 mL/min/1.73 square meters)  •  Stage 3B Moderate CKD (GFR = 30-44 mL/min/1.73 square meters)  •  Stage 4 Severe CKD (GFR = 15-29 mL/min/1.73 square meters)  •  Stage 5 End Stage CKD (GFR <15 mL/min/1.73 square meters)  Note: GFR calculation is accurate only with a steady state creatinine    CBC and differential [[id number]]  (Abnormal) Collected: 08/23/23 1121    Lab Status: Final result Specimen: Blood from Arm, Right Updated: 08/23/23 1131     WBC 17.23 Thousand/uL      RBC 2.89 Million/uL      Hemoglobin 9.2 g/dL      Hematocrit 29.7 %       fL      MCH 31.8 pg      MCHC 31.0 g/dL      RDW 14.3 %      MPV 10.0 fL      Platelets 375 Thousands/uL      nRBC 0 /100 WBCs      Neutrophils Relative 88 %      Immat GRANS % 1 %      Lymphocytes Relative 6 %      Monocytes Relative 5 %      Eosinophils Relative 0 %      Basophils Relative 0 %      Neutrophils Absolute 15.10 Thousands/µL      Immature Grans Absolute 0.11 Thousand/uL      Lymphocytes Absolute 1.07 Thousands/µL      Monocytes Absolute 0.88 Thousand/µL      Eosinophils Absolute 0.03 Thousand/µL      Basophils Absolute 0.04 Thousands/µL                  CT chest abdomen pelvis w contrast   Final Result by Andrez Kuhn MD (08/23 1401)      No acute fracture identified. No acute intrathoracic or intra-abdominal pathology. Mild hypoventilatory changes in the lungs. Chronic findings, as per the body of the report. Workstation performed: [id number]         CT head without contrast   Final Result by Andrez Kuhn MD (08/23 1337)      No acute intracranial abnormality. Chronic findings, as above. Workstation performed: [id number]         CT spine cervical without contrast   Final Result by Andrez Kuhn MD (08/23 1343)   Mild anterior wedge deformity of C5 without other suspicious findings, possibly chronic. Correlate to exclude focal tenderness. Otherwise no fracture suspected. Advanced multilevel degenerative changes. Grade 1 anterolisthesis of C7 on T1, likely degenerative in etiology. Workstation performed: [id number]         XR knee 4+ views left injury   Final Result by Radha Potts MD (08/23 1131)      No acute osseous abnormality. Workstation performed: [id number]         XR knee 4+ views Right injury   Final Result by Radha Potts MD (08/23 1132)      No acute osseous abnormality.             Workstation performed: [id number]                    Procedures  ECG 12 Lead Documentation Only    Date/Time: 8/23/2023 10:27 AM    Performed by: Gaby Slater PA-C  Authorized by: Gaby Slater ANN    Indications / Diagnosis:  Fall  ECG reviewed by me, the ED Provider: yes    Patient location:  ED  Previous ECG:     Previous ECG:  Unavailable  Interpretation:     Interpretation: normal    Rate:     ECG rate:  65    ECG rate assessment: normal    Rhythm:     Rhythm: sinus rhythm    Ectopy:     Ectopy: none    QRS:     QRS axis:  Normal    QRS intervals:  Normal  Conduction:     Conduction: normal    ST segments:     ST segments:  Normal  T waves:     T waves: normal      ECG 12 Lead Documentation Only    Date/Time: 8/23/2023 2:54 PM    Performed by: Leroy Flower PA-C  Authorized by: Leroy Flower PA-C    Indications / Diagnosis:  65  ECG reviewed by me, the ED Provider: yes    Patient location:  ED  Previous ECG:     Previous ECG:  Compared to current    Similarity:  No change  Interpretation:     Interpretation: normal    Rate:     ECG rate:  65    ECG rate assessment: normal    Rhythm:     Rhythm: sinus rhythm    Ectopy:     Ectopy: none    QRS:     QRS axis:  Normal    QRS intervals:  Normal  Conduction:     Conduction: normal    ST segments:     ST segments:  Normal  T waves:     T waves: normal               ED Course  ED Course as of 08/23/23 1601   Wed Aug 23, 2023   1135 WBC(!): 17.23  Increased from 7.62 two weeks ago. Only SIRS criteria met. 1136 Hemoglobin(!): 9.2  Decreased from 11.5 two weeks ago. 1149 Attempted to call above and beyond for more information. No answer. Will call again shortly. [phone]   1156 hs TnI 0hr: 46  Need delta. 1309 Urine Macroscopic, POC(!)  Positive nitrites, small blood. 1345 Attempted to call above and beyond 3 more times without answer. Left message for call back. 1348 CT head without contrast  IMPRESSION:     No acute intracranial abnormality. 1349 CT spine cervical without contrast  IMPRESSION:  Mild anterior wedge deformity of C5 without other suspicious findings, possibly chronic.  Correlate to exclude focal tenderness. Otherwise no fracture suspected. Advanced multilevel degenerative changes. Grade 1 anterolisthesis of C7 on T1, likely degenerative in etiology. 80 CT spine cervical without contrast  IMPRESSION:     No acute fracture identified. No acute intrathoracic or intra-abdominal pathology. Mild hypoventilatory changes in the lungs. 1526 Discussed with trauma, low threshold for transfer. TT sent to neurosurgery. 200 Dr. Asa Alfonso from neurosurgery review scans. States look chronic and tenderness likely from muscular spasm and not unstable spine. No collar indicated. Can follow up outpatient. [facility] hsTnI(!): 42  Will admit to [facility].   1552 TT sent to [facility]. SBIRT 22yo+    Flowsheet Row Most Recent Value   Initial Alcohol Screen: US AUDIT-C     1. How often do you have a drink containing alcohol? 0 Filed at: 08/23/2023 1025   2. How many drinks containing alcohol do you have on a typical day you are drinking? 0 Filed at: 08/23/2023 1025   3b. FEMALE Any Age, or MALE 65+: How often do you have 4 or more drinks on one occassion? 0 Filed at: 08/23/2023 1025   Audit-C Score 0 Filed at: 08/23/2023 1025   NELSON: How many times in the past year have you. .. Used an illegal drug or used a prescription medication for non-medical reasons? Never Filed at: 08/23/2023 1025                    Medical Decision Making  Patient is an 81year old female with a history of dementia presenting for evaluation after an unwitnessed fall. History limited by patient's dementia. All vitals are within normal limits. Patient confused however this is baseline, GCS 14. See physical exam for pertinent findings. Given poor history, will obtain CT head, cervical spine, chest/abd/pelvis to rule out traumatic injuries. Pt briefly complained of lightheadedness so will add CBC, CMP, Trop, EKG, UA to evaluate for infection or cardiac causes. Will treat symptomatically with tylenol.     Leukocytosis of 17.23. Hgb 9.2 decreased from 11 two weeks ago. No significant electrolyte abnormalities, EMANUEL. Alk phos elevated at baseline. Initial troponin 46, delta 42. EKG NSR without ischemic changes. CT head, chest/abdomen/pelvis negative for acute traumatic injuries. CT cervical spine shows anterior wedge deformity of C5, possibly chronic however recommend correlation with point tenderness. Also showed grade 1 anterolisthesis of C7 on T1, likely degenerative. Discussed with trauma and neurosurgery Dr. Sirisha Prabhakar who reviewed scans. No collar or transfer is indicated at this time for cervical spine findings. Delta troponin did increase by 4, EKG remained without changes or evidence of ischemia. Discussed case with [facility] Dr. Evone Duane who accepted inpatient under his service. Discussed findings and plan for admission with patient/family who were in agreement. All questions were answered at bedside to the satisfaction of the patient/family. Patient remained stable throughout stay in the emergency department. Patient stable at time of admission. Abnormal CT scan, cervical spine: acute illness or injury  Bilateral knee pain: acute illness or injury  Fall, initial encounter: acute illness or injury  UTI (urinary tract infection): acute illness or injury  Amount and/or Complexity of Data Reviewed  Labs: ordered. Decision-making details documented in ED Course. Radiology: ordered. Decision-making details documented in ED Course. Risk  OTC drugs. Prescription drug management. Disposition  Final diagnoses:   Fall, initial encounter   Bilateral knee pain   Abnormal CT scan, cervical spine   UTI (urinary tract infection)     Time reflects when diagnosis was documented in both MDM as applicable and the Disposition within this note     Time User Action Codes Description Comment    8/23/2023  3:41 PM Doyle Mcnair. [facility]] Fall, initial encounter     8/23/2023  3:41 PM Doyle Wade [Z41.142,  M25.562] Bilateral knee pain     8/23/2023  3:42 PM Jordyn Dills Add [S12. 9XXA] Wedge fracture of cervical vertebra ([address])     8/23/2023  3:42 PM Jignesh Redo. 9XXA] Wedge fracture of cervical vertebra ([address])     8/23/2023  3:42 PM Jordyn Dills Add [M43.9] Wedge deformity on x-ray of spine     8/23/2023  3:42 PM Jordyn Dills Remove [M43.9] Wedge deformity on x-ray of spine     8/23/2023  3:43 PM Jordyn Dills Add [R93.7] Abnormal CT scan, cervical spine     8/23/2023  3:44 PM Jordyn Dills Add [N39.0] UTI (urinary tract infection)       ED Disposition     ED Disposition   Admit    Condition   Stable    Date/Time   Wed Aug 23, 2023  3:58 PM    Comment   Case was discussed with ARNAUD and the patient's admission status was agreed to be Admission Status: inpatient status to the service of Dr. Rodney Claros. Follow-up Information     Follow up With Specialties Details Why [facility] 2nd Floor Family Medicine Schedule an appointment as soon as possible for a visit   [address] [address] [phone]            Current Discharge Medication List      CONTINUE these medications which have NOT CHANGED    Details   acetaminophen (TYLENOL) 500 mg tablet Take 500 mg by mouth 3 (three) times a day      aspirin 81 mg chewable tablet Chew 81 mg daily      calcium citrate-vitamin D 315 mg-5 mcg tablet Take 1 tablet by mouth 2 (two) times a day      citalopram (CeleXA) 20 mg tablet Take 20 mg by mouth daily      levothyroxine 25 mcg tablet Take 25 mcg by mouth daily      Menthol, Topical Analgesic, (Biofreeze) 10 % CREA Apply topically every 4 (four) hours      Nutritional Supplements (Ensure Plus High Protein) LIQD Take by mouth in the morning             No discharge procedures on file.     PDMP Review     None          ED Provider  Electronically Signed by           Yvette Quinonez PA-C  08/23/23 9268

## 2023-08-23 NOTE — PLAN OF CARE
Problem: MOBILITY - ADULT  Goal: Maintain or return to baseline ADL function  Description: INTERVENTIONS:  -  Assess patient's ability to carry out ADLs; assess patient's baseline for ADL function and identify physical deficits which impact ability to perform ADLs (bathing, care of mouth/teeth, toileting, grooming, dressing, etc.)  - Assess/evaluate cause of self-care deficits   - Assess range of motion  - Assess patient's mobility; develop plan if impaired  - Assess patient's need for assistive devices and provide as appropriate  - Encourage maximum independence but intervene and supervise when necessary  - Involve family in performance of ADLs  - Assess for home care needs following discharge   - Consider OT consult to assist with ADL evaluation and planning for discharge  - Provide patient education as appropriate  8/23/2023 1725 by Osmin Yadav RN  Outcome: Progressing  8/23/2023 1725 by Osmin Yadav RN  Outcome: Progressing  Goal: Maintains/Returns to pre admission functional level  Description: INTERVENTIONS:  - Perform BMAT or MOVE assessment daily.   - Set and communicate daily mobility goal to care team and patient/family/caregiver. - Collaborate with rehabilitation services on mobility goals if consulted  - Perform Range of Motion  times a day. - Reposition patient every  hours.   - Dangle patient  times a day  - Stand patient  times a day  - Ambulate patient  times a day  - Out of bed to chair  times a day   - Out of bed for meals  times a day  - Out of bed for toileting  - Record patient progress and toleration of activity level   8/23/2023 1725 by Osmin Yadav RN  Outcome: Progressing  8/23/2023 1725 by Osmin Yadav RN  Outcome: Progressing     Problem: PAIN - ADULT  Goal: Verbalizes/displays adequate comfort level or baseline comfort level  Description: Interventions:  - Encourage patient to monitor pain and request assistance  - Assess pain using appropriate pain scale  - Administer analgesics based on type and severity of pain and evaluate response  - Implement non-pharmacological measures as appropriate and evaluate response  - Consider cultural and social influences on pain and pain management  - Notify physician/advanced practitioner if interventions unsuccessful or patient reports new pain  Outcome: Progressing     Problem: INFECTION - ADULT  Goal: Absence or prevention of progression during hospitalization  Description: INTERVENTIONS:  - Assess and monitor for signs and symptoms of infection  - Monitor lab/diagnostic results  - Monitor all insertion sites, i.e. indwelling lines, tubes, and drains  - Monitor endotracheal if appropriate and nasal secretions for changes in amount and color  - [place] appropriate cooling/warming therapies per order  - Administer medications as ordered  - Instruct and encourage patient and family to use good hand hygiene technique  - Identify and instruct in appropriate isolation precautions for identified infection/condition  Outcome: Progressing  Goal: Absence of fever/infection during neutropenic period  Description: INTERVENTIONS:  - Monitor WBC    Outcome: Progressing     Problem: SAFETY ADULT  Goal: Maintain or return to baseline ADL function  Description: INTERVENTIONS:  -  Assess patient's ability to carry out ADLs; assess patient's baseline for ADL function and identify physical deficits which impact ability to perform ADLs (bathing, care of mouth/teeth, toileting, grooming, dressing, etc.)  - Assess/evaluate cause of self-care deficits   - Assess range of motion  - Assess patient's mobility; develop plan if impaired  - Assess patient's need for assistive devices and provide as appropriate  - Encourage maximum independence but intervene and supervise when necessary  - Involve family in performance of ADLs  - Assess for home care needs following discharge   - Consider OT consult to assist with ADL evaluation and planning for discharge  - Provide patient education as appropriate  8/23/2023 1725 by Montserrat Love RN  Outcome: Progressing  8/23/2023 1725 by Montserrat Love RN  Outcome: Progressing  Goal: Maintains/Returns to pre admission functional level  Description: INTERVENTIONS:  - Perform BMAT or MOVE assessment daily.   - Set and communicate daily mobility goal to care team and patient/family/caregiver. - Collaborate with rehabilitation services on mobility goals if consulted  - Perform Range of Motion  times a day. - Reposition patient every  hours.   - Dangle patient  times a day  - Stand patient  times a day  - Ambulate patient  times a day  - Out of bed to chair  times a day   - Out of bed for meals  times a day  - Out of bed for toileting  - Record patient progress and toleration of activity level   8/23/2023 1725 by Montserrat Love RN  Outcome: Progressing  8/23/2023 1725 by Montserrat Love RN  Outcome: Progressing  Goal: Patient will remain free of falls  Description: INTERVENTIONS:  - Educate patient/family on patient safety including physical limitations  - Instruct patient to call for assistance with activity   - Consult OT/PT to assist with strengthening/mobility   - Keep Call bell within reach  - Keep bed low and locked with side rails adjusted as appropriate  - Keep care items and personal belongings within reach  - Initiate and maintain comfort rounds  - Make Fall Risk Sign visible to staff  - Offer Toileting every  Hours, in advance of need  - Initiate/Maintain alarm  - Obtain necessary fall risk management equipment:   - Apply yellow socks and bracelet for high fall risk patients  - Consider moving patient to room near nurses station  Outcome: Progressing     Problem: DISCHARGE PLANNING  Goal: Discharge to home or other facility with appropriate resources  Description: INTERVENTIONS:  - Identify barriers to discharge w/patient and caregiver  - Arrange for needed discharge resources and transportation as appropriate  - Identify discharge learning needs (meds, wound care, etc.)  - Arrange for interpretive services to assist at discharge as needed  - Refer to Case Management Department for coordinating discharge planning if the patient needs post-hospital services based on physician/advanced practitioner order or complex needs related to functional status, cognitive ability, or social support system  Outcome: Progressing     Problem: Knowledge Deficit  Goal: Patient/family/caregiver demonstrates understanding of disease process, treatment plan, medications, and discharge instructions  Description: Complete learning assessment and assess knowledge base.   Interventions:  - Provide teaching at level of understanding  - Provide teaching via preferred learning methods  Outcome: Progressing

## 2023-08-23 NOTE — ASSESSMENT & PLAN NOTE
Elevated troponins, 46, 88  Currently denying any chest pain or shortness of breath  EKG showing normal sinus rhythm without ischemic changes  Suspect likely due to recent fall

## 2023-08-23 NOTE — ED NOTES
RN placed pt on bed pan to attempt to try and get a urine specimen     Guillermina Kothari RN  08/23/23 7102

## 2023-08-23 NOTE — ED NOTES
RN called facility that pt came from to let them know pt was being admitted     Karen Ramirez RN  08/23/23 8427

## 2023-08-23 NOTE — ASSESSMENT & PLAN NOTE
UA concerning for UTI with hx of falls  Noted to be foul smelling  Urine cx order, continue rocephin

## 2023-08-23 NOTE — ASSESSMENT & PLAN NOTE
Not on medications PTA  Elevated, possibly due to increase agitation  Monitor at this time, PRN hydralyzine

## 2023-08-24 LAB
ALBUMIN SERPL BCP-MCNC: 3.6 G/DL (ref 3.5–5)
ALP SERPL-CCNC: 90 U/L (ref 34–104)
ALT SERPL W P-5'-P-CCNC: 9 U/L (ref 7–52)
ANION GAP SERPL CALCULATED.3IONS-SCNC: 8 MMOL/L
AST SERPL W P-5'-P-CCNC: 15 U/L (ref 13–39)
BILIRUB SERPL-MCNC: 0.46 MG/DL (ref 0.2–1)
BUN SERPL-MCNC: 19 MG/DL (ref 5–25)
CALCIUM SERPL-MCNC: 8.5 MG/DL (ref 8.4–10.2)
CHLORIDE SERPL-SCNC: 108 MMOL/L (ref 96–108)
CO2 SERPL-SCNC: 24 MMOL/L (ref 21–32)
CREAT SERPL-MCNC: 0.71 MG/DL (ref 0.6–1.3)
ERYTHROCYTE [DISTWIDTH] IN BLOOD BY AUTOMATED COUNT: 14.1 % (ref 11.6–15.1)
GFR SERPL CREATININE-BSD FRML MDRD: 77 ML/MIN/1.73SQ M
GLUCOSE SERPL-MCNC: 91 MG/DL (ref 65–140)
HCT VFR BLD AUTO: 36.3 % (ref 34.8–46.1)
HGB BLD-MCNC: 11.5 G/DL (ref 11.5–15.4)
MCH RBC QN AUTO: 31.7 PG (ref 26.8–34.3)
MCHC RBC AUTO-ENTMCNC: 31.7 G/DL (ref 31.4–37.4)
MCV RBC AUTO: 100 FL (ref 82–98)
PLATELET # BLD AUTO: 271 THOUSANDS/UL (ref 149–390)
PMV BLD AUTO: 9.6 FL (ref 8.9–12.7)
POTASSIUM SERPL-SCNC: 3.5 MMOL/L (ref 3.5–5.3)
PROT SERPL-MCNC: 6.5 G/DL (ref 6.4–8.4)
RBC # BLD AUTO: 3.63 MILLION/UL (ref 3.81–5.12)
SODIUM SERPL-SCNC: 140 MMOL/L (ref 135–147)
WBC # BLD AUTO: 9.51 THOUSAND/UL (ref 4.31–10.16)

## 2023-08-24 PROCEDURE — 80053 COMPREHEN METABOLIC PANEL: CPT | Performed by: STUDENT IN AN ORGANIZED HEALTH CARE EDUCATION/TRAINING PROGRAM

## 2023-08-24 PROCEDURE — 97166 OT EVAL MOD COMPLEX 45 MIN: CPT

## 2023-08-24 PROCEDURE — 87081 CULTURE SCREEN ONLY: CPT | Performed by: INTERNAL MEDICINE

## 2023-08-24 PROCEDURE — 99223 1ST HOSP IP/OBS HIGH 75: CPT

## 2023-08-24 PROCEDURE — 99232 SBSQ HOSP IP/OBS MODERATE 35: CPT

## 2023-08-24 PROCEDURE — 97163 PT EVAL HIGH COMPLEX 45 MIN: CPT

## 2023-08-24 PROCEDURE — 85027 COMPLETE CBC AUTOMATED: CPT | Performed by: STUDENT IN AN ORGANIZED HEALTH CARE EDUCATION/TRAINING PROGRAM

## 2023-08-24 RX ORDER — QUETIAPINE FUMARATE 25 MG/1
12.5 TABLET, FILM COATED ORAL
Status: DISCONTINUED | OUTPATIENT
Start: 2023-08-24 | End: 2023-08-25 | Stop reason: HOSPADM

## 2023-08-24 RX ORDER — ACETAMINOPHEN 325 MG/1
975 TABLET ORAL EVERY 8 HOURS SCHEDULED
Status: DISCONTINUED | OUTPATIENT
Start: 2023-08-24 | End: 2023-08-25 | Stop reason: HOSPADM

## 2023-08-24 RX ADMIN — DICLOFENAC SODIUM 2 G: 10 GEL TOPICAL at 17:39

## 2023-08-24 RX ADMIN — CEFTRIAXONE 1000 MG: 1 INJECTION, SOLUTION INTRAVENOUS at 15:30

## 2023-08-24 RX ADMIN — DICLOFENAC SODIUM 2 G: 10 GEL TOPICAL at 12:30

## 2023-08-24 RX ADMIN — QUETIAPINE FUMARATE 12.5 MG: 25 TABLET ORAL at 21:07

## 2023-08-24 RX ADMIN — CITALOPRAM HYDROBROMIDE 20 MG: 20 TABLET ORAL at 08:34

## 2023-08-24 RX ADMIN — ACETAMINOPHEN 975 MG: 325 TABLET ORAL at 21:07

## 2023-08-24 RX ADMIN — LEVOTHYROXINE SODIUM 25 MCG: 25 TABLET ORAL at 05:49

## 2023-08-24 RX ADMIN — ENOXAPARIN SODIUM 40 MG: 40 INJECTION SUBCUTANEOUS at 08:34

## 2023-08-24 RX ADMIN — DICLOFENAC SODIUM 2 G: 10 GEL TOPICAL at 21:16

## 2023-08-24 RX ADMIN — SODIUM CHLORIDE, SODIUM GLUCONATE, SODIUM ACETATE, POTASSIUM CHLORIDE, MAGNESIUM CHLORIDE, SODIUM PHOSPHATE, DIBASIC, AND POTASSIUM PHOSPHATE 75 ML/HR: .53; .5; .37; .037; .03; .012; .00082 INJECTION, SOLUTION INTRAVENOUS at 05:50

## 2023-08-24 RX ADMIN — ACETAMINOPHEN 975 MG: 325 TABLET ORAL at 13:41

## 2023-08-24 RX ADMIN — ASPIRIN 81 MG 81 MG: 81 TABLET ORAL at 08:34

## 2023-08-24 NOTE — PLAN OF CARE
Problem: OCCUPATIONAL THERAPY ADULT  Goal: Performs self-care activities at highest level of function for planned discharge setting. See evaluation for individualized goals. Description: Treatment Interventions: ADL retraining, Functional transfer training, UE strengthening/ROM, Endurance training, Patient/family training, Equipment evaluation/education, Compensatory technique education, Continued evaluation, Activityengagement          See flowsheet documentation for full assessment, interventions and recommendations. Note: Limitation: Decreased ADL status, Decreased UE strength, Decreased Safe judgement during ADL, Decreased cognition, Decreased endurance, Decreased self-care trans, Decreased high-level ADLs  Prognosis: Fair  Assessment: Pt is a 81 y.o. female seen for OT evaluation s/p adm to [facility] on 8/23/2023 w/ Fall . CT head negative for acute process, CT C-spine with mild C5 anterior wedge deformity. ED discussed with trauma, does not require c-collar, or transfer to B. Patient without neck pain Comorbidities affecting pt’s functional performance include a significant PMH of hypothyroidism, dementia, HTN. Pt with active OT orders and activity orders for Up with assistance. Pt lives at [address] [place] [address]. At baseline, pt required assist w/ ADLs and IADLs. Son reports pt I w/ functional transfers/mobility w/ use of SPC. (-) . (+) falls PTA. Upon evaluation, pt currently requires Supervision for UB ADLs, Mod A for LB ADLs, Min A for toileting, Supervision for bed mobility, and Min A for functional mobility/transfers 2* the following deficits impacting occupational performance: decreased strength , decreased balance, decreased activity tolerance, limited functional reach, impaired memory, impaired sequencing, impaired problem solving, decreased safety awareness and increased pain.  These impairments, as well at pt’s personal factors of: difficulty performing ADLs, difficulty performing transfers/mobility, limited insight into deficits, decreased initiation and engagement, fall risk , functional decline , increased reliance on DME  and advanced age limit pt’s ability to safely engage in all baseline areas of occupation. Pt to continue to benefit from continued acute OT services during hospital stay to address defined deficits and to maximize level of functional independence in the following Occupational Performance areas: grooming, bathing/shower, toilet hygiene, dressing, health maintenance, functional mobility, community mobility, clothing management and social participation. From OT standpoint, recommend return to intermediate w/ continued OT upon D/C.  OT will continue to follow pt 2-3x/wk to address the following goals to  w/in 10-14 days:     OT Discharge Recommendation: Return to facility with rehabilitation services

## 2023-08-24 NOTE — APP STUDENT NOTE
LYNDON STUDENT  Inpatient Progress Note for TRAINING ONLY  Not Part of Legal Medical Record       Progress Note - Александр Kraft 81 y.o. female MRN: [id number]    Unit/Bed#: E5 -01 Encounter: [id number]      Assessment and Plan  1. Fall   -  Continue monitoring patients mobility around the hospital room   - Continue caring for fall wounds which include bilateral knee ecchymosis  - Continue observation  2. HTN  - continue Hydralazine 10 mg injection  - continue monitoring BP readings  - believe that patient is experiencing HTN due to the fall. 3. Hypothyroidism  - continue Levothyroxine 25mg tablet daily   - monitor TSH levels  4. Dementia  - continue Celexa 20 mg tablets daily. - continue Seroquel 12.5mg tablet at bedtime daily. - continue monitoring for agitated states. 5. UTI  - continue IV Rocephin until urine cultures come back. Antibiotics can be adjusted as needed. - monitor I/O  - monitor for hematuria   6. Elevated Tropin  - stable   - continue monitoring, believe elevated tropin is a response to the fall. Subjective:   Patient is a 82 yo female who as admitted into the hospital yesterday due to a fall. Patient complained of bilateral knee pain. Today she complains of bilateral knee pain but denies chest pain and SOB. Patient denies dysuria. Patient appeared to have AMS as she was answering H&P questions inappropriately. When asked about her fall and the details she was unable to recall. Believe this is due to the dementia. During the exam she was unable to follow certain instructions. Since admission, she is being treated with prophylaxis for a UTI and awaiting urine cultures. Objective:     Vitals: Blood pressure 146/72, pulse 65, temperature 97.8 °F (36.6 °C), resp. rate 18, weight 60.5 kg (133 lb 6.1 oz), SpO2 93 %. ,Body mass index is 25.2 kg/m².       Intake/Output Summary (Last 24 hours) at 8/24/2023 1301  Last data filed at 8/23/2023 1518  Gross per 24 hour   Intake 50 ml Output --   Net 50 ml       Physical Exam: {Exam, Complete:33480}   General Appearance:  - awake   - sitting upright in hospital chair  - no acute distress    HENT:   - Head was nontender with scalp mobility. - post occipital tenderness. - tenderness on cervical spine  - tenderness and swollen sublingual lymph nodes  - crepitus and tenderness of TMJ.  - pain with neck flexion and extension.   - Eyes appeared ROBBIE. - claims to wear glasses for reading. Cardiology  - Normal rate and rhythm     Pulmonary   - CTA bilaterally     Abdominal   - soft   - tender to midline palpitation   - normal bowel sounds. Skin  - Bilateral ecchymosis of knee  - ecchymosis on LE, forearm, and hands, claims that it is from the fall.   - LE cool to touch, tender on palpitation     Musculoskeletal  - no significant LE edmea. - LE tender to palpation  - no pitting edema    Neurological  - CN 2-12 appear to be intact  - did not follow EOM instructions  - CN 5, had sensitivity to facial regions but could not definitively identify location of touch.      Psychiatry  - no aware of time or place  - inappropriate answers to H&P questions   - normal mood  -normal affect   Invasive Devices     Peripheral Intravenous Line  Duration           Peripheral IV 08/23/23 Left;Proximal;Ventral (anterior) Forearm <1 day                Candida Iggy PA-S

## 2023-08-24 NOTE — UTILIZATION REVIEW
Initial Clinical Review    Admission: Date/Time/Statement:   Admission Orders (From admission, onward)     Ordered        08/23/23 1559  INPATIENT ADMISSION  Once                      Orders Placed This Encounter   Procedures   • INPATIENT ADMISSION     Standing Status:   Standing     Number of Occurrences:   1     Order Specific Question:   Level of Care     Answer:   Med Surg [16]     Order Specific Question:   Estimated length of stay     Answer:   More than 2 Midnights     Order Specific Question:   Certification     Answer:   I certify that inpatient services are medically necessary for this patient for a duration of greater than two midnights. See H&P and MD Progress Notes for additional information about the patient's course of treatment. ED Arrival Information     Expected   -    Arrival   8/23/2023 10:17    Acuity   Urgent            Means of arrival   Ambulance    Escorted by   [facility] ([address] [place] [address])    Service   Hospitalist    Admission type   Emergency            Arrival complaint   FALL           Chief Complaint   Patient presents with   • Fall     Pt arriving from facility via ems due to an unwitnessed fall. Abrasions noted to bilateral knees. -thinners       Initial Presentation: 81 y.o. female  Presents to ED via  EMS  From SNF after a fall, unwitnessed. Unable to answer questions due to dementia. Agitated in ED, removing IV lines. CT head unremarkable. Ct c/spine with degenerative changes, C 5  Wedge deformity,  No C/collar  Needed per trauma. U/A  Shows  Possible infection. Additional PMH  Is  HTN, Jehovah witness and hypothyroidism. Troponin elevated,  46, 88. Admit  Ip S/P fall, with  Elevated  Troponin, UTI and plan is  Fall precautions, monitor labs, geriatric consult,  Start seroquel, zyprexa, NAGA,  PT/OT   And wait urine culture. Date:    8/24      Day 2:   Geriatric consult  Maintain delirium precautions. Redirect/reorient as needed.   Started on seroquel, changing to PRN. Needs PT/OT. Remains on  NAGA. Continue current meds/treatment plan. Date:    8/25    Day 3: Has surpassed a 2nd midnight with active treatments and services, which include   D,C     To SNF. ED Triage Vitals   Temperature Pulse Respirations Blood Pressure SpO2   08/23/23 1022 08/23/23 1022 08/23/23 1022 08/23/23 1022 08/23/23 1022   97.6 °F (36.4 °C) 64 18 146/65 96 %      Temp Source Heart Rate Source Patient Position - Orthostatic VS BP Location FiO2 (%)   08/23/23 1022 08/23/23 1022 08/23/23 1022 08/23/23 1022 --   Oral Monitor Lying Right arm       Pain Score       08/23/23 1211       No Pain          Wt Readings from Last 1 Encounters:   08/23/23 60.5 kg (133 lb 6.1 oz)     Additional Vital Signs:   97.8 °F (36.6 °C) 65 18 146/72 97 93 % -- --    08/23/23 23:49:18 -- 94 19 117/81 93 98 % -- --   08/23/23 23:48:47 -- -- 18 117/81 93 -- -- --   08/23/23 17:10:15 97.4 °F (36.3 °C) Abnormal  59 -- 152/73 99 95 % -- --   08/23/23 1629 -- 77 18 173/80 Abnormal  -- 93 % None (Room air) Lying   08/23/23 1513 -- 74 17 189/90 Abnormal  -- 98 % -- Lying   08/23/23 1250 97.1 °F (36.2 °C) Abnormal  69 16 134/64 -- 97 % -- Lying   08/23/23 1022 97.6 °F (36.4 °C) 64 18 146/65 -- 96 % None (Room air) Lying       Pertinent Labs/Diagnostic Test Results:   EKG   NSR    No ischemic changes  CT chest abdomen pelvis w contrast   Final Result by Mercedes Barber MD (08/23 1401)      No acute fracture identified. No acute intrathoracic or intra-abdominal pathology. Mild hypoventilatory changes in the lungs. Chronic findings, as per the body of the report. Workstation performed: [id number]         CT head without contrast   Final Result by Mercedes Barber MD (08/23 8446)      No acute intracranial abnormality. Chronic findings, as above.                   Workstation performed: [id number]         CT spine cervical without contrast   Final Result by Mercedes Barber MD (08/23 0138)   Mild anterior wedge deformity of C5 without other suspicious findings, possibly chronic. Correlate to exclude focal tenderness. Otherwise no fracture suspected. Advanced multilevel degenerative changes. Grade 1 anterolisthesis of C7 on T1, likely degenerative in etiology. Workstation performed: [id number]         XR knee 4+ views left injury   Final Result by German Rizvi MD (08/23 1131)      No acute osseous abnormality. Workstation performed: [id number]         XR knee 4+ views Right injury   Final Result by German Rizvi MD (08/23 1132)      No acute osseous abnormality.             Workstation performed: [id number]               Results from last 7 days   Lab Units 08/24/23  0543 08/23/23  1121   WBC Thousand/uL 9.51 17.23*   HEMOGLOBIN g/dL 11.5 9.2*   HEMATOCRIT % 36.3 29.7*   PLATELETS Thousands/uL 271 386   NEUTROS ABS Thousands/µL  --  15.10*         Results from last 7 days   Lab Units 08/24/23  0543 08/23/23  1121   SODIUM mmol/L 140 139   POTASSIUM mmol/L 3.5 4.8   CHLORIDE mmol/L 108 109*   CO2 mmol/L 24 22   ANION GAP mmol/L 8 8   BUN mg/dL 19 34*   CREATININE mg/dL 0.71 0.87   EGFR ml/min/1.73sq m 77 60   CALCIUM mg/dL 8.5 9.4     Results from last 7 days   Lab Units 08/24/23  0543 08/23/23  1121   AST U/L 15 21   ALT U/L 9 7   ALK PHOS U/L 90 115*   TOTAL PROTEIN g/dL 6.5 7.7   ALBUMIN g/dL 3.6 4.3   TOTAL BILIRUBIN mg/dL 0.46 0.42         Results from last 7 days   Lab Units 08/24/23  0543 08/23/23  1121   GLUCOSE RANDOM mg/dL 91 99           Results from last 7 days   Lab Units 08/23/23  1638 08/23/23  1509 08/23/23  1121   HS TNI 0HR ng/L  --   --  46   HS TNI 2HR ng/L  --  88*  --    HSTNI D2 ng/L  --  42*  --    HS TNI 4HR ng/L 89*  --   --    HSTNI D4 ng/L 43*  --   --                    Results from last 7 days   Lab Units 08/23/23  1208   CLARITY UA  Slightly Cloudy   COLOR UA  Yellow   SPEC GRAV UA  >=1.030   PH UA  5.5   GLUCOSE UA mg/dl Negative   KETONES UA mg/dl Negative   BLOOD UA  Small*   PROTEIN UA mg/dl Trace*   NITRITE UA  Positive*   BILIRUBIN UA  Negative   UROBILINOGEN UA E.U./dl 0.2   LEUKOCYTES UA  Negative   WBC UA /hpf 2-4*   RBC UA /hpf 2-4*   BACTERIA UA /hpf Innumerable*   EPITHELIAL CELLS WET PREP /hpf Occasional   MUCUS THREADS  Innumerable*         ED Treatment:   Medication Administration from 08/23/2023 1016 to 08/23/2023 1702       Date/Time Order Dose Route Action Comments     08/23/2023 1122 EDT acetaminophen (TYLENOL) tablet 975 mg 975 mg Oral Given --     08/23/2023 1227 EDT iohexol (OMNIPAQUE) 350 MG/ML injection (SINGLE-DOSE) 100 mL 100 mL Intravenous Given --     08/23/2023 1518 EDT cefTRIAXone (ROCEPHIN) IVPB (premix in dextrose) 1,000 mg 50 mL 0 mg Intravenous Stopped --     08/23/2023 1448 EDT cefTRIAXone (ROCEPHIN) IVPB (premix in dextrose) 1,000 mg 50 mL 1,000 mg Intravenous New Bag --          Admitting Diagnosis: UTI (urinary tract infection) [N39.0]  Pain [R52]  Dementia (HCC) [F03.90]  Bilateral knee pain [M25.561, M25.562]  Abnormal CT scan, cervical spine [R93.7]  Age/Sex: 81 y.o. female  Admission Orders:  Scheduled Medications:  aspirin, 81 mg, Oral, Daily  cefTRIAXone, 1,000 mg, Intravenous, Q24H  citalopram, 20 mg, Oral, Daily  enoxaparin, 40 mg, Subcutaneous, Daily  levothyroxine, 25 mcg, Oral, Early Morning  QUEtiapine, 12.5 mg, Oral, HS      Continuous IV Infusions:  multi-electrolyte, 75 mL/hr, Intravenous, Continuous      PRN Meds:  acetaminophen, 650 mg, Oral, Q6H PRN  hydrALAZINE, 10 mg, Intravenous, Q4H PRN  OLANZapine, 5 mg, Oral, Q6H PRN  ondansetron, 4 mg, Intravenous, Q6H PRN      Fall precautions    IP CONSULT TO [facility]    [facility] Utilization Review Department  ATTENTION: Please call with any questions or concerns to [phone] and carefully listen to the prompts so that you are directed to the right person.  All voicemails are confidential.  [place] all requests for admission clinical reviews, approved or denied determinations and any other requests to dedicated fax number below belonging to the campus where the patient is receiving treatment.  List of dedicated fax numbers for the Facilities:  [place] DENIALS (Administrative/Medical Necessity) [phone] [address] (Maternity/NICU/Pediatrics) [phone]   [address] [place] [address] [phone]   [facility] [address] [facility] [phone]   [address] [place] [address] [address] [facility] [address] [phone]   [address] [facility] [address] [facility] [address] [phone]

## 2023-08-24 NOTE — PROGRESS NOTES
[facility]  Progress Note  Name: Cecilia Wahl  MRN: [id number]  Unit/Bed#: E5 -01 I Date of Admission: 8/23/2023   Date of Service: 8/24/2023 I Hospital Day: 1    Assessment/Plan   * Fall  Assessment & Plan  · Patient presented due to fall. Underlying dementia patient is poor historian  · CT head negative for acute process, CT C-spine with mild C5 anterior wedge deformity. · ED discussed with trauma, does not require c-collar, or transfer to [facility]. · CT chest abdomen pelvis without acute process  · Leukocytosis 17 on admission, resolved today  · Continue IV Rocephin day 2 , await urine cultures  · PT and OT recommending home with home health     UTI (urinary tract infection)  Assessment & Plan  · UA concerning for UTI with hx of falls  · Noted to be foul smelling  · Iv rocephin day 2  · Follow urine culture    Hypertension  Assessment & Plan  · Not on medications PTA  · Elevated, possibly due to increase agitation and pain after fall upon admission  · Resolved, -150      Elevated troponin  Assessment & Plan  · Elevated troponins, 46, 88  · Denies chest pain  · EKG showing normal sinus rhythm without ischemic changes  · Suspect likely due to recent fall    Dementia (HCC)  Assessment & Plan  · History of dementia [facility] at [facility]  · Continue Celexa 20 mg once daily  · More calm today   · Continue Seroquel PRN 12.5 mg bedtime  · Fall precautions  · Geriatrics consulted appreciate recommendations     Hypothyroidism  Assessment & Plan  · Continue levothyroxine           VTE Pharmacologic Prophylaxis:   Moderate Risk (Score 3-4) - Pharmacological DVT Prophylaxis Ordered: enoxaparin (Lovenox). Patient Centered Rounds: I performed bedside rounds with nursing staff today. Discussions with Specialists or Other Care Team Provider: CM    Education and Discussions with Family / Patient: Updated  (son) via phone.     Total Time Spent on Date of Encounter in care of patient: 45 minutes This time was spent on one or more of the following: performing physical exam; counseling and coordination of care; obtaining or reviewing history; documenting in the medical record; reviewing/ordering tests, medications or procedures; communicating with other healthcare professionals and discussing with patient's family/caregivers. Current Length of Stay: 1 day(s)  Current Patient Status: Inpatient   Certification Statement: The patient will continue to require additional inpatient hospital stay due to continue IV antibiotics   Discharge Plan: Anticipate discharge in 24-48 hrs to prior assisted or independent living facility. Code Status: Level 1 - Full Code    Subjective:   Patient was seen sitting in a chair at bedside. She reports some pain in her knees and her back. She denies other acute complaints. Objective:     Vitals:   Temp (24hrs), Av.6 °F (36.4 °C), Min:97.4 °F (36.3 °C), Max:97.8 °F (36.6 °C)    Temp:  [97.4 °F (36.3 °C)-97.8 °F (36.6 °C)] 97.8 °F (36.6 °C)  HR:  [59-94] 65  Resp:  [17-19] 18  BP: (117-189)/(72-90) 146/72  SpO2:  [93 %-98 %] 93 %  Body mass index is 25.2 kg/m². Input and Output Summary (last 24 hours): Intake/Output Summary (Last 24 hours) at 2023 1406  Last data filed at 2023 1518  Gross per 24 hour   Intake 50 ml   Output --   Net 50 ml       Physical Exam:   Physical Exam  Vitals and nursing note reviewed. Constitutional:       Appearance: Normal appearance. HENT:      Head: Normocephalic and atraumatic. Eyes:      General: No scleral icterus. Cardiovascular:      Rate and Rhythm: Normal rate and regular rhythm. Pulmonary:      Effort: Pulmonary effort is normal.      Breath sounds: Normal breath sounds. Abdominal:      General: Abdomen is flat. Bowel sounds are normal.      Palpations: Abdomen is soft. Tenderness: There is no abdominal tenderness.    Musculoskeletal:      Right lower leg: No edema. Left lower leg: No edema. Skin:     General: Skin is warm and dry. Neurological:      Mental Status: She is alert. Psychiatric:         Mood and Affect: Mood normal.        Additional Data:     Labs:  Results from last 7 days   Lab Units 08/24/23  0543 08/23/23  1121   WBC Thousand/uL 9.51 17.23*   HEMOGLOBIN g/dL 11.5 9.2*   HEMATOCRIT % 36.3 29.7*   PLATELETS Thousands/uL 271 386   NEUTROS PCT %  --  88*   LYMPHS PCT %  --  6*   MONOS PCT %  --  5   EOS PCT %  --  0     Results from last 7 days   Lab Units 08/24/23  0543   SODIUM mmol/L 140   POTASSIUM mmol/L 3.5   CHLORIDE mmol/L 108   CO2 mmol/L 24   BUN mg/dL 19   CREATININE mg/dL 0.71   ANION GAP mmol/L 8   CALCIUM mg/dL 8.5   ALBUMIN g/dL 3.6   TOTAL BILIRUBIN mg/dL 0.46   ALK PHOS U/L 90   ALT U/L 9   AST U/L 15   GLUCOSE RANDOM mg/dL 91                       Lines/Drains:  Invasive Devices     Peripheral Intravenous Line  Duration           Peripheral IV 08/23/23 Left;Proximal;Ventral (anterior) Forearm <1 day                      Imaging: No pertinent imaging reviewed.     Recent Cultures (last 7 days):         Last 24 Hours Medication List:   Current Facility-Administered Medications   Medication Dose Route Frequency Provider Last Rate   • acetaminophen  975 mg Oral [facility] SIMIN Perez     • aspirin  81 mg Oral Daily Herb Baez MD     • cefTRIAXone  1,000 mg Intravenous Q24H Herb Baez MD     • citalopram  20 mg Oral Daily Herb Baez MD     • Diclofenac Sodium  2 g Topical 4x Daily SIMIN Perez     • enoxaparin  40 mg Subcutaneous Daily Herb Baez MD     • hydrALAZINE  10 mg Intravenous Q4H PRN Indra Almeida MD     • levothyroxine  25 mcg Oral Early Morning Herb Baez MD     • multi-electrolyte  75 mL/hr Intravenous Continuous Herb Baez MD 75 mL/hr (08/24/23 0550)   • ondansetron  4 mg Intravenous Q6H PRN Herb Baez MD     • QUEtiapine  12.5 mg Oral HS PRN SIMIN Perez Today, Patient Was Seen By: Carmen Riley PA-C    **Please Note: This note may have been constructed using a voice recognition system. **

## 2023-08-24 NOTE — PLAN OF CARE
Problem: PHYSICAL THERAPY ADULT  Goal: Performs mobility at highest level of function for planned discharge setting. See evaluation for individualized goals. Description: Treatment/Interventions: Functional transfer training, LE strengthening/ROM, Therapeutic exercise, Endurance training, Patient/family training, Equipment eval/education, Bed mobility, Gait training, Continued evaluation, Spoke to nursing, OT  Equipment Recommended: Valarie Manichery (needs RW prior to being DC from the hospital)       See flowsheet documentation for full assessment, interventions and recommendations. Note: Prognosis: Fair  Problem List: Decreased strength, Decreased range of motion, Decreased endurance, Impaired balance, Decreased mobility, Decreased cognition, Impaired judgement, Decreased safety awareness, Decreased skin integrity, Pain  Assessment: Pt is a 80 yo female admitted to [facility] 2* pain, s/p fall, UTI, dementia,inc troponis,B knee abrasions. Pt resides at [facility] with use of SPC (I)ly for mobility, needs A for ADLs and IADLs, s/p x1 recent fall with inc R knee pain following fall. pt currently is not at functional mobility baseline, needs A for mobility with new use of DME (RW), inc pain R knee during R knee flex/ext and WB,masimo monitoring, multiple lines,dec cognition,dec activity tolerance. Pt demonstrates minimal deficits during functional mobility and gait including dec endurance, dec BLE strength, inc R knee pain during WB and AROM in flex and ext of the knee,dec balance,dec cognition (baseline?) and needs min Ax1 for GT with use of RW and TT, S for bed mobility. Pt would cont to benefit from skilled inpt PT services to maximize functional independence and to dec caregiver burden upon being DC from the hospital.        PT Discharge Recommendation: Home with home health rehabilitation    See flowsheet documentation for full assessment.

## 2023-08-24 NOTE — ASSESSMENT & PLAN NOTE
· Patient presented due to fall. Underlying dementia patient is poor historian  · CT head negative for acute process, CT C-spine with mild C5 anterior wedge deformity. · ED discussed with trauma, does not require c-collar, or transfer to [facility].    · CT chest abdomen pelvis without acute process  · Leukocytosis 17 on admission, resolved today  · Continue IV Rocephin day 2 , await urine cultures  · PT and OT recommending home with home health

## 2023-08-24 NOTE — ASSESSMENT & PLAN NOTE
· Not on medications PTA  · Elevated, possibly due to increase agitation and pain after fall upon admission  · Resolved, -150

## 2023-08-24 NOTE — ASSESSMENT & PLAN NOTE
· History of dementia currently resides at [facility]  · Continue Celexa 20 mg once daily  · More calm today   · Continue Seroquel PRN 12.5 mg bedtime  · Fall precautions  · Geriatrics consulted appreciate recommendations

## 2023-08-24 NOTE — NURSING NOTE
Update given to Aviva from [facility] at [address] [place] [address]. RN notified of patients admission and current plan of care. All questions answered at this time.

## 2023-08-24 NOTE — RESTORATIVE TECHNICIAN NOTE
Restorative Technician Note      Patient Name: Dalton Yadav     Restorative Tech Visit Date: 08/24/23  Note Type: Mobility  Patient Position Upon Consult: Bedside chair  Mobility / Activity Provided: assisted pt in helping pt ambulate, fold clothes, and set up her coloring book  Activity Performed: Ambulated  Patient Position at End of Consult: Bedside chair;  All needs within reach; Bed/Chair alarm activated

## 2023-08-24 NOTE — ASSESSMENT & PLAN NOTE
· UA concerning for UTI with hx of falls  · Noted to be foul smelling  · Iv rocephin day 2  · Follow urine culture No

## 2023-08-24 NOTE — PLAN OF CARE
Problem: MOBILITY - ADULT  Goal: Maintain or return to baseline ADL function  Description: INTERVENTIONS:  -  Assess patient's ability to carry out ADLs; assess patient's baseline for ADL function and identify physical deficits which impact ability to perform ADLs (bathing, care of mouth/teeth, toileting, grooming, dressing, etc.)  - Assess/evaluate cause of self-care deficits   - Assess range of motion  - Assess patient's mobility; develop plan if impaired  - Assess patient's need for assistive devices and provide as appropriate  - Encourage maximum independence but intervene and supervise when necessary  - Involve family in performance of ADLs  - Assess for home care needs following discharge   - Consider OT consult to assist with ADL evaluation and planning for discharge  - Provide patient education as appropriate  Outcome: Progressing  Goal: Maintains/Returns to pre admission functional level  Description: INTERVENTIONS:  - Perform BMAT or MOVE assessment daily.   - Set and communicate daily mobility goal to care team and patient/family/caregiver.    - Collaborate with rehabilitation services on mobility goals if consulted  - Out of bed for toileting  - Record patient progress and toleration of activity level   Outcome: Progressing     Problem: PAIN - ADULT  Goal: Verbalizes/displays adequate comfort level or baseline comfort level  Description: Interventions:  - Encourage patient to monitor pain and request assistance  - Assess pain using appropriate pain scale  - Administer analgesics based on type and severity of pain and evaluate response  - Implement non-pharmacological measures as appropriate and evaluate response  - Consider cultural and social influences on pain and pain management  - Notify physician/advanced practitioner if interventions unsuccessful or patient reports new pain  Outcome: Progressing     Problem: INFECTION - ADULT  Goal: Absence or prevention of progression during hospitalization  Description: INTERVENTIONS:  - Assess and monitor for signs and symptoms of infection  - Monitor lab/diagnostic results  - Monitor all insertion sites, i.e. indwelling lines, tubes, and drains  - Monitor endotracheal if appropriate and nasal secretions for changes in amount and color  - [place] appropriate cooling/warming therapies per order  - Administer medications as ordered  - Instruct and encourage patient and family to use good hand hygiene technique  - Identify and instruct in appropriate isolation precautions for identified infection/condition  Outcome: Progressing  Goal: Absence of fever/infection during neutropenic period  Description: INTERVENTIONS:  - Monitor WBC    Outcome: Progressing     Problem: SAFETY ADULT  Goal: Maintain or return to baseline ADL function  Description: INTERVENTIONS:  -  Assess patient's ability to carry out ADLs; assess patient's baseline for ADL function and identify physical deficits which impact ability to perform ADLs (bathing, care of mouth/teeth, toileting, grooming, dressing, etc.)  - Assess/evaluate cause of self-care deficits   - Assess range of motion  - Assess patient's mobility; develop plan if impaired  - Assess patient's need for assistive devices and provide as appropriate  - Encourage maximum independence but intervene and supervise when necessary  - Involve family in performance of ADLs  - Assess for home care needs following discharge   - Consider OT consult to assist with ADL evaluation and planning for discharge  - Provide patient education as appropriate  Outcome: Progressing  Goal: Maintains/Returns to pre admission functional level  Description: INTERVENTIONS:  - Perform BMAT or MOVE assessment daily.   - Set and communicate daily mobility goal to care team and patient/family/caregiver.    - Collaborate with rehabilitation services on mobility goals if consulted  - Out of bed for toileting  - Record patient progress and toleration of activity level   Outcome: Progressing  Goal: Patient will remain free of falls  Description: INTERVENTIONS:  - Educate patient/family on patient safety including physical limitations  - Instruct patient to call for assistance with activity   - Consult OT/PT to assist with strengthening/mobility   - Keep Call bell within reach  - Keep bed low and locked with side rails adjusted as appropriate  - Keep care items and personal belongings within reach  - Initiate and maintain comfort rounds  - Make Fall Risk Sign visible to staff  - Offer Toileting every 2 Hours, in advance of need  - Initiate/Maintain bed alarm  - Apply yellow socks and bracelet for high fall risk patients  - Consider moving patient to room near nurses station  Outcome: Progressing     Problem: DISCHARGE PLANNING  Goal: Discharge to home or other facility with appropriate resources  Description: INTERVENTIONS:  - Identify barriers to discharge w/patient and caregiver  - Arrange for needed discharge resources and transportation as appropriate  - Identify discharge learning needs (meds, wound care, etc.)  - Arrange for interpretive services to assist at discharge as needed  - Refer to Case Management Department for coordinating discharge planning if the patient needs post-hospital services based on physician/advanced practitioner order or complex needs related to functional status, cognitive ability, or social support system  Outcome: Progressing     Problem: Knowledge Deficit  Goal: Patient/family/caregiver demonstrates understanding of disease process, treatment plan, medications, and discharge instructions  Description: Complete learning assessment and assess knowledge base.   Interventions:  - Provide teaching at level of understanding  - Provide teaching via preferred learning methods  Outcome: Progressing     Problem: Prexisting or High Potential for Compromised Skin Integrity  Goal: Skin integrity is maintained or improved  Description: INTERVENTIONS:  - Identify patients at risk for skin breakdown  - Assess and monitor skin integrity  - Assess and monitor nutrition and hydration status  - Monitor labs   - Assess for incontinence   - Turn and reposition patient  - Assist with mobility/ambulation  - Relieve pressure over bony prominences  - Avoid friction and shearing  - Provide appropriate hygiene as needed including keeping skin clean and dry  - Evaluate need for skin moisturizer/barrier cream  - Collaborate with interdisciplinary team   - Patient/family teaching  - Consider wound care consult   Outcome: Progressing

## 2023-08-24 NOTE — CONSULTS
Consultation - Geriatrics   Alvino Apley 81 y.o. female MRN: [id number]  Unit/Bed#: E5 -01 Encounter: [id number]      Assessment/Plan    Dementia  · Patient has a known documented history of dementia   · Unclear when she was diagnosed   · Son at the bedside notes that her baseline is oriented to person and place   · She does not have any behaviors at baseline   · She was noted to have behaviors on admission and was started on Seroquel last night   · Patient is noted to be calm and cooperative on exam today   · Will adjust scheduled Seroquel to prn   · Please see additional recommendations acute agitation and behaviors below   · Patient is alert and oriented to person on exam today   · Most recent TSH on 8/7/2023 noted to be 1.682  · No vitamin B 12 level noted in epic  · Consider checking on routine labs   · CT of the head on 8/23/2023 revealed no acute intracranial abnormalities  · No MoCA noted in epic  · Maintain delirium precautions as discussed below  · Redirect and reorient as needed  · Keep physically, mentally, and socially active     Delirium   • Baseline mentation: alert and oriented to person and place   • Current mentation: alert and oriented to person   • Patient is at high risk secondary to age, dementia, UTI, antibiotic use, fall, and hospitalization   • Maintain delirium precautions   · Provide redirection, reorientation, and distraction techniques  · Maintain fall and safety precautions   · Assist with ADLs/IADLs  · Avoid deliriogenic medications such as tramadol, benzodiazepines, anticholinergics, benadryl  · Treat pain using geriatric pain protocol   · Encourage oral hydration and nutrition   · Monitor for constipation and urinary retention   · Implement sleep hygiene and limit night time interuptions   · Maintain sleep-wake cycle   · Encourage early and frequent mobilization   · Encourage participation in group activities  • Most recent EKG on 8/23/2023 revealed a QTc interval of 438  · If all other interventions are unsuccessful for acute agitation and behaviors, can consider Zyprexa 2.5 mg IM Q 8 hours prn   • Would avoid benzodiazepines such as Ativan as these can worsen delirium     Deconditioning   • Baseline function: needs assistance with ADLs and is dependent with IADLs  • Patient is at increased risk for deconditioning secondary to dementia, UTI, weakness, gait dysfunction, and hospitalization   • Continue to optimize diet, hydration, and mobility for healing  · GFR 77 on labs today   · Keep hydrated   · Infection   · Patient with UA suspicious for UTI on admission   · She is on IV Rocephin for this   · Urine culture is currently pending   · Leukocytosis resolved on labs today   • Monitor for signs and symptoms of infection, dehydration, DVT, and skin breakdown    Frailty   Clinical Frail Scale: 6- Moderately Frail  · Need help with all outside activities  · Need help with stairs and bathing  · May need assistance with dressing  • Most recent albumin on labs today noted to be 3.6  • Consider nutrition consult  • Encourage protein supplementation     Ambulatory Dysfunction/Falls  • Son notes no other recent falls at home   • Patient presented to the hospital after sustaining a fall at her facility   • She typically ambulates with a cane at home   • PT/OT consulted to assist with strengthening/mobility and assist with discharge planning to appropriate level of care  • Assess patient frequently for physical needs, encourage use of assistant devices as needed and directed by PT/OT  • Identify cognitive and physical deficits and behaviors that affect risk of falls  • Consider moving patient closer to nursing station to monitor more closely for impulsive behavior which may increase risk of falls  • [place] fall and safety precautions   • Educate patient/family on patient safety including physical limitations and importance of using call bell for assistance   • Modify environment to reduce risk of injury including disconnecting from pole when not in use, ensuring adequate lighting in room and restroom, ensuring that path to restroom is clear and free of trip hazards  • Out of bed as tolerated    Impaired Vision   • Son denies any vision impairment   • Recommend use of corrective lenses at all appropriate times  • Encourage adequate lighting and encourage use of assistance with ambulation  • Keep personal belongings close to avoid reaching  • Encourage appropriate footwear at all times  • Recommend large font for printed materials provided to patient    Impaired Hearing   • Son denies any hearing impairment   • Hearing impairment strongly correlated with depression, cognitive impairment, delirium and falls in the older adult  • Use hearing aids or sound amplifier  • Speak face to face  • Use clear dictation and enunciation of words    Dentition/Appetite   • Patient does not have dentures   · Son admits she will be fitted for these next month   · Patient notes she has a good appetite at baseline   • Ensure meal consistency is appropriate for all abilities   • Facility records indicate that there is an order for Ensure when patient consumes less than 50% of her meals   · Consider ordering Ensure if she is not eating well here   • Consider nutrition consult   • Continue aspiration precautions     Elimination   • Patient is incontinent of bowel and bladder at times at baseline  • Son notes that she does wear pads at baseline   • No bowel movement documented since admission   • She is not currently on a bowel regimen  • Monitor for constipation and urinary retention     Insomnia   • Son is not aware of any difficulty sleeping at baseline   • She is not currently on any medication for sleep   • Will order Seroquel 12.5 mg daily at bedtime as needed  • Can also consider melatonin for sleep    • First line is behavioral therapy   • Avoid sedative hypnotics including benzodiazepines and benadryl  • Encourage staying awake during the day   • Encourage daytime activities and morning exercise   • Decrease or eliminate daytime naps   • Avoid caffeine especially during late afternoon and evening hours  • Establish a nighttime routine  • Implement sleep hygiene and limit nighttime interruptions    Anxiety/Depression  • Patient has a documented history of anxiety and depression   • She is currently on citalopram 20 mg daily   • Mood appears stable on exam today   • Continue supportive care     Urinary Tract Infection   · UA on admission suspicious for a UTI with small blood, nitrites, and innumerable bacteria   · She was also noted to have leukocytosis on admission with a WBC count of 17.23  · Urine culture is still pending   · Leukocytosis has since resolved   · Patient remains on Rocephin IV at this time   · Management per primary team     Home Safety  · Patient resides at [address] [place] [address]   · She needs assistance for ADLs and is dependent for IADLs    Advanced Care Planning  · Level 1 Full Code    Home Medication Review   Medication list obtained from facility records   · Acetaminophen 1000 mg TID   · Aspirin 81 mg daily   · Biofreeze to right hip Q 4 hours prn for right hip pain  · Calcium+D3 1000 mg-800 units daily   · Citalopram 20 mg daily   · Levothyroxine 25 mcg daily    I have personally reviewed this medication list with the patients pharmacy listed above. History of Present Illness   Physician Requesting Consult: Ashvin Romero Pe*  Reason for Consult / Principal Problem: Fall and Dementia  Hx and PE limited by: Dementia    HPI: Jason Valiente is a 81y.o. year old female who has hypertension, hypothyroidism, anxiety, depression, and dementia and presented to the hospital after sustaining a fall at her facility. She resides at [facility] and her fall was unwitnessed. She was pan scanned and no acute abnormalities were noted. 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  Trauma service was contacted and she was not recommended for a c-collar. Transfer to [facility] was not required. Her UA was suspicious for a UTI and she was also noted to have leukocytosis. Her urine culture is currently pending. She is on IV Rocephin. PT and OT have been consulted. Geriatrics is being consulted for fall and dementia. Care was coordinated with the patients lazarus Almazan at the bedside. He states that the patient resides at [address] [place] [address]. She needs assistance for ADLs and is dependent for IADLs. At baseline, she is oriented to person and place. She does recognize members of her family. He states that she is very helpful at the facility but they will not allow her to help other residents. She does not have any behaviors at baseline. Son notes no other recent falls. He admits that he ambulates with the cane at baseline. She does not have vision or hearing impairment that he is aware of. She does not currently wear dentures but is going to be fitted for them next month. She is incontinent of bowel and bladder at times. She does wear a pad. Son is not aware of any sleep issues at baseline. The patient was seen and evaluated today at the bedside for geriatric consult. She is noted to be sitting up in her chair in no acute distress. She is pleasant, calm, and cooperative. She is oriented only to person. She notes that she has daily pains that are different each day. She offers no other acute complaints. Care was coordinated with patients nurse Shahrzad Welch. She notes no acute issues or events overnight. Care was also coordinated with Naima Keller PA-C with internal medicine.      Inpatient consult to Gerontology  Consult performed by: SIMIN Munoz  Consult ordered by: Katarzyna Hansen MD          Review of Systems   Unable to perform ROS: Dementia       Historical Information   Past Medical History:   Diagnosis Date   • Cataracts, bilateral    • Dementia ([address])    • HTN (hypertension)    • Hypothyroidism    • Insomnia    • Osteoporosis      History reviewed. No pertinent surgical history. Social History   Social History     Substance and Sexual Activity   Alcohol Use Never     Social History     Substance and Sexual Activity   Drug Use Never     Social History     Tobacco Use   Smoking Status Unknown   Smokeless Tobacco Not on file     Family History: History reviewed. No pertinent family history. Meds/Allergies   Current meds:   Current Facility-Administered Medications   Medication Dose Route Frequency   • acetaminophen (TYLENOL) tablet 650 mg  650 mg Oral Q6H PRN   • aspirin chewable tablet 81 mg  81 mg Oral Daily   • cefTRIAXone (ROCEPHIN) IVPB (premix in dextrose) 1,000 mg 50 mL  1,000 mg Intravenous Q24H   • citalopram (CeleXA) tablet 20 mg  20 mg Oral Daily   • enoxaparin (LOVENOX) subcutaneous injection 40 mg  40 mg Subcutaneous Daily   • hydrALAZINE (APRESOLINE) injection 10 mg  10 mg Intravenous Q4H PRN   • levothyroxine tablet 25 mcg  25 mcg Oral Early Morning   • multi-electrolyte (PLASMALYTE-A/ISOLYTE-S PH 7.4) IV solution  75 mL/hr Intravenous Continuous   • OLANZapine (ZyPREXA) tablet 5 mg  5 mg Oral Q6H PRN   • ondansetron (ZOFRAN) injection 4 mg  4 mg Intravenous Q6H PRN   • QUEtiapine (SEROquel) tablet 12.5 mg  12.5 mg Oral HS      No Known Allergies    Objective   Vitals: Blood pressure 146/72, pulse 65, temperature 97.8 °F (36.6 °C), resp. rate 18, weight 60.5 kg (133 lb 6.1 oz), SpO2 93 %. ,Body mass index is 25.2 kg/m². Physical Exam  Vitals and nursing note reviewed. Constitutional:       General: She is not in acute distress. Appearance: Normal appearance. She is not ill-appearing. HENT:      Head: Normocephalic. Mouth/Throat:      Mouth: Mucous membranes are dry. Eyes:      General: No scleral icterus. Conjunctiva/sclera: Conjunctivae normal.   Cardiovascular:      Rate and Rhythm: Normal rate and regular rhythm.    Pulmonary:      Effort: Pulmonary effort is normal. No respiratory distress. Abdominal:      General: Bowel sounds are normal. There is no distension. Palpations: Abdomen is soft. Tenderness: There is no abdominal tenderness. Musculoskeletal:         General: No swelling or tenderness. Skin:     General: Skin is warm and dry. Neurological:      Mental Status: She is alert. She is disoriented. Comments: Oriented to person   Disoriented to place and time   Hx of dementia          Lab Results:   Results from last 7 days   Lab Units 08/24/23  0543   WBC Thousand/uL 9.51   HEMOGLOBIN g/dL 11.5   HEMATOCRIT % 36.3   PLATELETS Thousands/uL 271        Results from last 7 days   Lab Units 08/24/23  0543   POTASSIUM mmol/L 3.5   CHLORIDE mmol/L 108   CO2 mmol/L 24   BUN mg/dL 19   CREATININE mg/dL 0.71   CALCIUM mg/dL 8.5   ALK PHOS U/L 90   ALT U/L 9   AST U/L 15       Imaging Studies: I have personally reviewed pertinent reports. EKG, Pathology, and Other Studies: I have personally reviewed pertinent reports. VTE Prophylaxis: Enoxaparin (Lovenox)    Code Status: Level 1 - Full Code      Please note:  Voice-recognition software may have been used in the preparation of this document. Occasional wrong word or "sound-alike" substitutions may have occurred due to the inherent limitations of voice recognition software. Interpretation should be guided by context.

## 2023-08-24 NOTE — CASE MANAGEMENT
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 Primary Insurance: [address] [place] [address]  Secondary Insurance:     ASSESSMENT:  Active Health Care Proxies    There are no active Health Care Proxies on file. Readmission Root Cause  30 Day Readmission: No    Patient Information  Admitted from[de-identified] Facility ([facility])  Mental Status: Alert, Confused  During Assessment patient was accompanied by: Son  Assessment information provided by[de-identified] Son  Primary Caregiver: Other (Comment)  Caregiver's Name[de-identified] [id number] A Team  Caregiver's Telephone Number[de-identified] [phone]  Support Systems: Son, / (and long-term Care Team)  [facility]: [address] [place] [address] do you live in?: [address] entry access options.  Select all that apply.: No steps to enter home  Type of Current Residence: Facility  Upon entering residence, is there a bedroom on the main floor (no further steps)?: Yes  Upon entering residence, is there a bathroom on the main floor (no further steps)?: Yes  In the last 12 months, was there a time when you were not able to pay the mortgage or rent on time?: No  In the last 12 months, how many places have you lived?: 1  In the last 12 months, was there a time when you did not have a steady place to sleep or slept in a shelter (including now)?: No  Homeless/housing insecurity resource given?: N/A  Living Arrangements:  (Lives w/ residents and care team at [facility])     Activities of Daily Living Prior to Admission  Functional Status: Assistance  Completes ADLs independently?: No  Level of ADL dependence: Assistance  Ambulates independently?: No  Level of ambulatory dependence: Assistance  Does patient use assisted devices?: Yes  Assisted Devices (DME) used: Straight Cane  Does the patient have a history of Short-Term Rehab?: Yes  Does patient have a history of HHC?: Yes  Does patient currently have [address]?: No    Patient Information Continued  Income Source: Pension/detention  Does patient have prescription coverage?: Yes  Within the past 12 months, you worried that your food would run out before you got the money to buy more.: Never true  Within the past 12 months, the food you bought just didn't last and you didn't have money to get more.: Never true  Food insecurity resource given?: N/A  Does patient receive dialysis treatments?: No  Does patient have a history of substance abuse?: No  Does patient have a history of Mental Health Diagnosis?: No    Means of Transportation  Means of Transport to [facility][de-identified] Family transport (or [facility])  In the past 12 months, has lack of transportation kept you from medical appointments or from getting medications?: No  In the past 12 months, has lack of transportation kept you from meetings, work, or from getting things needed for daily living?: No  Was application for public transport provided?: N/A        DISCHARGE DETAILS:    Discharge planning discussed with[de-identified] Adrianne Haywood (Son) [phone] (M)  Freedom of Choice: Yes  Comments - Freedom of Choice: Son prefers Pt discharges back to her detention [address]  CM contacted family/caregiver?: Yes (Son at bedside)  Were Treatment Team discharge recommendations reviewed with patient/caregiver?: Yes  Did patient/caregiver verbalize understanding of patient care needs?: Yes  Were patient/caregiver advised of the risks associated with not following Treatment Team discharge recommendations?: Yes    Contacts  Patient Contacts: Aissatou Woodruff (Son) [phone] (M)  Relationship to Patient[de-identified] Family  Contact Method: In Person  Reason/Outcome: Continuity of Care, Emergency Contact, Discharge Planning    Treatment Team Recommendation: Facility Return, Assisted Living (w/ Facility Therapy Team)  Transport at Discharge : Automobile, Family (v [facility])    Additional Comments: CM met w/ Pt and her son to discuss scp. Pt resides at [address] [place] [address] of [address] [place] [address]. PLan is for Pt to return. CM lvms for [facility] and [employer] (Pt's insurance) with goal to coordinate care. Likely 24h to discharge. CM is following. ..

## 2023-08-24 NOTE — OCCUPATIONAL THERAPY NOTE
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 No pertinent surgical history. 08/24/23 0915   OT Last Visit   OT Visit Date 08/24/23   Note Type   Note type Evaluation   Pain Assessment   Pain Assessment Tool FLACC   Pain Location/Orientation Location: Back; Location: Buttocks   Pain Rating: FLACC (Rest) - Face 0   Pain Rating: FLACC (Rest) - Legs 0   Pain Rating: FLACC (Rest) - Activity 0   Pain Rating: FLACC (Rest) - Cry 0   Pain Rating: FLACC (Rest) - Consolability 0   Score: FLACC (Rest) 0   Pain Rating: FLACC (Activity) - Face 1   Pain Rating: FLACC (Activity) - Legs 0   Pain Rating: FLACC (Activity) - Activity 0   Pain Rating: FLACC (Activity) - Cry 0   Pain Rating: FLACC (Activity) - Consolability 1   Score: FLACC (Activity) 2   Restrictions/Precautions   Weight Bearing Precautions Per Order No   Other Precautions Cognitive; Chair Alarm; Bed Alarm;Multiple lines; Fall Risk;Pain   Home Living   Type of Home Assisted living   Home Layout One level   [address] [facility]   Additional Comments Pt lives at [address] [facility]. Prior Function   Level of [place] Independent with functional mobility; Needs assistance with ADLs; Needs assistance with [address] [place] [address] Rd staff   Receives Help From Personal care attendant   IADLs Family/Friend/Other provides transportation; Family/Friend/Other provides meals; Family/Friend/Other provides medication management   Falls in the last 6 months 1 to 4   Vocational Retired Comments At baseline, pt required assist w/ ADLs and IADLs. Son reports pt I w/ functional transfers/mobility w/ use of SPC. (-) . (+) falls PTA. Lifestyle   Autonomy At baseline, pt required assist w/ ADLs and IADLs. Son reports pt I w/ functional transfers/mobility w/ use of SPC. (-) . (+) falls PTA. Reciprocal Relationships 4 children   Service to Others Retired   ADL   Where Assessed Chair   Eating Assistance 7  [address] 5  [address] 5  [address] 3  Moderate Assistance   [address] 5  Supervision/Setup   LB [address] SADI [place] Final 3  Moderate [address] [place]  4  Minimal Assistance   Functional Assistance 4  Minimal Assistance   Bed Mobility   Supine to Sit 5  Supervision   Additional items HOB elevated; Bedrails; Increased time required;Verbal cues   Sit to Supine Unable to assess   Additional Comments Pt seated OOB in chair with chair alarm activated at end of session. Call bell and phone within reach. All needs met and pt reports no further questions for OT at this time. Transfers   Sit to Stand 4  Minimal assistance   Additional items Assist x 1;Bedrails; Increased time required;Verbal cues   Stand to Sit 4  Minimal assistance   Additional items Assist x 1; Armrests; Increased time required;Verbal cues   Toilet transfer 4  Minimal assistance   Additional items Assist x 1; Increased time required;Verbal cues;Standard toilet   Additional Comments Cues for safe technique and hand placement   Functional Mobility   Functional Mobility 4  Minimal assistance   Additional Comments Assist x1   Additional items Rolling walker   Balance   Static Sitting Fair   Dynamic Sitting Fair -   Static Standing Fair -   Dynamic Standing Poor +   Ambulatory Poor +   Activity Tolerance   Activity Tolerance Patient limited by fatigue   Medical Staff [address], PT   Nurse Made Aware yes;  Merari Guallpa, RN   RUE Assessment   RUE Assessment WFL  (slight proximal limitations, however WFL. 4-/5 throughout)   LUE Assessment   LUE Assessment WFL  (slight proximal limitations, however WFL. 4-/5 throughout)   Hand Function   Gross Motor Coordination Functional   Fine Motor Coordination Functional   Sensation   Light Touch No apparent deficits   Proprioception   Proprioception No apparent deficits   Vision-Basic Assessment   Current Vision Wears glasses only for reading   Vision - Complex Assessment   Ocular Range of Motion Intact   Acuity Able to read clock/calendar on wall without difficulty; Able to read employee name badge without difficulty   Psychosocial   Psychosocial (WDL) WDL   Perception   Inattention/Neglect Appears intact   Cognition   Overall Cognitive Status Impaired   Arousal/Participation Alert; Cooperative   Attention Attends with cues to redirect   Orientation Level Oriented to person;Disoriented to place; Disoriented to time;Disoriented to situation   Memory Decreased recall of precautions;Decreased short term memory;Decreased recall of recent events   Following Commands Follows one step commands with increased time or repetition   Comments Hx of Dementia   Assessment   Limitation Decreased ADL status; Decreased UE strength;Decreased Safe judgement during ADL;Decreased cognition;Decreased endurance;Decreased self-care trans;Decreased high-level ADLs   Prognosis Fair   Assessment Pt is a 81 y.o. female seen for OT evaluation s/p adm to [facility] on 8/23/2023 w/ Fall . CT head negative for acute process, CT C-spine with mild C5 anterior wedge deformity. ED discussed with trauma, does not require c-collar, or transfer to SLB. Patient without neck pain Comorbidities affecting pt’s functional performance include a significant PMH of hypothyroidism, dementia, HTN. Pt with active OT orders and activity orders for Up with assistance. Pt lives at [address] [place] [address].  At baseline, pt required assist w/ ADLs and IADLs. Son reports pt I w/ functional transfers/mobility w/ use of SPC. (-) . (+) falls PTA. Upon evaluation, pt currently requires Supervision for UB ADLs, Mod A for LB ADLs, Min A for toileting, Supervision for bed mobility, and Min A for functional mobility/transfers 2* the following deficits impacting occupational performance: decreased strength , decreased balance, decreased activity tolerance, limited functional reach, impaired memory, impaired sequencing, impaired problem solving, decreased safety awareness and increased pain. These impairments, as well at pt’s personal factors of: difficulty performing ADLs, difficulty performing transfers/mobility, limited insight into deficits, decreased initiation and engagement, fall risk , functional decline , increased reliance on DME  and advanced age limit pt’s ability to safely engage in all baseline areas of occupation. Pt to continue to benefit from continued acute OT services during hospital stay to address defined deficits and to maximize level of functional independence in the following Occupational Performance areas: grooming, bathing/shower, toilet hygiene, dressing, health maintenance, functional mobility, community mobility, clothing management and social participation. From OT standpoint, recommend return to [facility] w/ continued OT upon D/C. OT will continue to follow pt 2-3x/wk to address the following goals to  w/in 10-14 days:   Goals   Patient Goals To use the bathroom   LTG Time Frame 10-14   Long Term Goal Please refer to LTGs listed below   Plan   Treatment Interventions ADL retraining;Functional transfer training;UE strengthening/ROM; Endurance training;Patient/family training;Equipment evaluation/education; Compensatory technique education;Continued evaluation; Activityengagement   Goal Expiration Date 23   OT Treatment Day 0   OT Frequency 2-3x/wk   Recommendation   OT Discharge Recommendation Return to facility with rehabilitation services   Additional Comments  The patient's raw score on the AM-PAC Daily Activity Inpatient Short Form is 17. A raw score of less than 19 suggests the patient may benefit from discharge to post-acute rehabilitation services. Please refer to the recommendation of the Occupational Therapist for safe discharge planning.    AM-PAC Daily Activity Inpatient   Lower Body Dressing 2   Bathing 2   Toileting 3   Upper Body Dressing 3   Grooming 3   Eating 4   Daily Activity Raw Score 17   Daily Activity Standardized Score (Calc for Raw Score >=11) 37.26   AM-PAC Applied Cognition Inpatient   Following a Speech/Presentation 3   Understanding Ordinary Conversation 3   Taking Medications 1   Remembering Where Things Are Placed or Put Away 2   Remembering List of 4-5 Errands 2   Taking Care of Complicated Tasks 1   Applied Cognition Raw Score 12   Applied Cognition Standardized Score 28.82       GOALS    Pt will improve activity tolerance to G for min 30 min txment sessions for increase engagement in functional tasks    Pt will complete bed mobility at a Mod I level w/ G balance/safety demonstrated to decrease caregiver assistance required     Pt will complete UB dressing/self care w/ mod I using adaptive device and DME as needed     Pt will complete LB dressing/self care w/ Supervision using adaptive device and DME as needed    Pt will complete toileting w/ Supervision w/ G hygiene/thoroughness using DME as needed    Pt will improve functional transfers to Mod I on/off all surfaces using DME as needed w/ G balance/safety     Pt will improve functional mobility during ADL/IADL/leisure tasks to Mod I using DME as needed w/ G balance/safety     Pt will be attentive 100% of the time during ongoing cognitive assessment w/ G participation to assist w/ safe d/c planning/recommendations    Pt will increase BUE strength by 1MM grade via AROM/AAROM exercises to increase independence in ADLs and transfers    Pt will increase standing tolerance to 5-8 mins with Fair+ dynamic standing balance to increase safety during participation in ADLs       Charissa Brooks, OTR/L

## 2023-08-24 NOTE — ASSESSMENT & PLAN NOTE
· Elevated troponins, 46, 88  · Denies chest pain  · EKG showing normal sinus rhythm without ischemic changes  · Suspect likely due to recent fall

## 2023-08-24 NOTE — PHYSICAL THERAPY NOTE
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 No pertinent surgical history. 08/24/23 0917   PT Last Visit   PT Visit Date 08/24/23   Note Type   Note type Evaluation   Pain Assessment   Pain Assessment Tool FLACC   Pain Rating: FLACC (Rest) - Face 0   Pain Rating: FLACC (Rest) - Legs 0   Pain Rating: FLACC (Rest) - Activity 0   Pain Rating: FLACC (Rest) - Cry 0   Pain Rating: FLACC (Rest) - Consolability 0   Score: FLACC (Rest) 0   Pain Rating: FLACC (Activity) - Face 1  (pt reports R knee pain during WB and flexion)   Pain Rating: FLACC (Activity) - Legs 1   Pain Rating: FLACC (Activity) - Activity 1   Pain Rating: FLACC (Activity) - Cry 1   Pain Rating: FLACC (Activity) - Consolability 1   Score: FLACC (Activity) 5   Restrictions/Precautions   Other Precautions Cognitive; Chair Alarm; Bed Alarm;Multiple lines;Telemetry; Fall Risk;Pain   Home Living   Type of Home Assisted living   Home Layout One level   [address]  (use of [facility] for ambulation)   Additional Comments pt resides at [facility] with use of [facility] for mobility PTA, needs A for ADLs and IADLs, per pts son ambulating (I) with use of [facility] PTA,s/p fall   Prior Function   Level of [place] Independent with functional mobility; Needs assistance with ADLs; Needs assistance with [address] [place] [address] Rd staff   Receives Help From Personal care attendant   IADLs Family/Friend/Other provides transportation; Family/Friend/Other provides meals; Family/Friend/Other provides medication management   Falls in the last 6 months 1 to 4   General   Additional Pertinent History s/p fall, inc troponin, UTI, dementia, mild lightheadedness,B knee abrasions   Family/Caregiver Present Yes  (pts son at end of PT evaluation)   Cognition   Overall Cognitive Status Impaired   Arousal/Participation Cooperative   Orientation Level Oriented to person;Disoriented to place; Disoriented to time;Disoriented to situation   Following Commands Follows one step commands with increased time or repetition   Subjective   Subjective pt supine in bed resting comfortably; pt willing and agreeable to work with PT and to participate in therapy intervention; "its a cloudy day out today". RLE Assessment   RLE Assessment   (dec resistance 2* inc pain during AROM flex and ext of knee; at least 3/5 grossly throughout)   LLE Assessment   LLE Assessment   (4/5 grossly throughout)   Coordination   Movements are Fluid and Coordinated 0   Coordination and Movement Description dec WB RLE 2* inc pain and discomfort, slow zaynab, dec BLE step length,forward flexed posture   Sensation WFL   Light Touch   RLE Light Touch Grossly intact   LLE Light Touch Grossly intact   Bed Mobility   Supine to Sit 5  Supervision   Additional items Assist x 1;Bedrails;Verbal cues   Transfers   Sit to Stand 4  Minimal assistance   Additional items Assist x 1;Bedrails; Increased time required;Verbal cues   Stand to Sit 4  Minimal assistance   Additional items Assist x 1; Armrests; Increased time required;Verbal cues   Toilet transfer 4  Minimal assistance   Additional items Assist x 1; Increased time required;Verbal cues;Standard toilet  (use of GB)   Ambulation/Elevation   Gait pattern Improper Weight shift; Antalgic;Narrow TRISH; Forward Flexion;Decreased R stance; Inconsistent zaynab; Foward flexed; Short stride; Ataxia   Gait Assistance 4  Minimal assist   Additional items Assist x 1;Verbal cues; Tactile cues   Assistive Device Rolling walker   Distance 12 feet x2 with use of RW on tile surface; one seated rest break to use the toilet to void   Balance   Static Sitting Fair  (chair alarm intact prior to leaving the pts room)   Dynamic Sitting Poor +   Static Standing Poor +   Dynamic Standing Poor +   Ambulatory Poor +   Activity Tolerance   Activity Tolerance Patient limited by fatigue;Patient limited by pain  (fair)   Medical Staff Made Aware Aylin Cordoba   Nurse Made Aware yes   Assessment   Prognosis Fair   Problem List Decreased strength;Decreased range of motion;Decreased endurance; Impaired balance;Decreased mobility; Decreased cognition; Impaired judgement;Decreased safety awareness;Decreased skin integrity;Pain   Assessment Pt is a 80 yo female [facility] 2* pain, s/p fall, UTI, dementia,inc troponis,B knee abrasions. Pt resides at [facility] with use of SPC (I)ly for mobility, needs A for ADLs and IADLs, s/p x1 recent fall with inc R knee pain following fall. pt currently is not at functional mobility baseline, needs A for mobility with new use of DME (RW), inc pain R knee during R knee flex/ext and WB,masimo monitoring, multiple lines,dec cognition,dec activity tolerance. Pt demonstrates minimal deficits during functional mobility and gait including dec endurance, dec BLE strength, inc R knee pain during WB and AROM in flex and ext of the knee,dec balance,dec cognition (baseline?) and needs min Ax1 for GT with use of RW and TT, S for bed mobility.  Pt would cont to benefit from skilled inpt PT services to maximize functional independence and to dec caregiver burden upon being DC from the hospital.   Goals   Patient Goals to use the BR   STG Expiration Date 09/03/23   Short Term Goal #1 in 7-10 days:  (1) Pt will be able to ambulate greater than 50 feet  with use of RW on various surfaces needing S level of A in order to A pt to return to PLOF, (2) activity tolerance:45 mins/45mins, (3) pt will be able to perform sit to stand transfers needing S level of A to and from various surfaces consistently in order to return to PLOF, (4) pt will be able to perform BM needing mod (I) level of A to A pt to return to PLOF, (5) (I) with BLE therapeutic ex HEP in various positions to A pt to inc balance,strength,mobility,endurance and to A to dec pain, (6) inc balance 1/2 grade in order to dec fall risk, (7) cont to provide pt and pt family education for safe D/C planning, (8) inc BLE strength 1/2 to 1 full grade in order to A pt to inc balance,strength,mobility,endurance and to A to dec pain   PT Treatment Day 0   Plan   Treatment/Interventions Functional transfer training;LE strengthening/ROM; Therapeutic exercise; Endurance training;Patient/family training;Equipment eval/education; Bed mobility;Gait training;Continued evaluation;Spoke to nursing;OT   PT Frequency 2-3x/wk   Recommendation   PT Discharge Recommendation Home with home health rehabilitation   Equipment Recommended Julisa Stringerlam  (needs RW prior to being DC from the hospital)   [address] walker   Change/add to [facility]?  No   AM-PAC Basic Mobility Inpatient   Turning in Flat Bed Without Bedrails 4   Lying on Back to Sitting on Edge of Flat Bed Without Bedrails 4   Moving Bed to Chair 3   Standing Up From Chair Using Arms 3   Walk in Room 3   Climb 3-5 Stairs With Railing 2   Basic Mobility Inpatient Raw Score 19   Basic Mobility Standardized Score 42.48   Highest Level Of Mobility   JH-HLM Goal 6: Walk 10 steps or more   JH-HLM Achieved 6: Walk 10 steps or more         @Malena Mckenzie, PT, DPT@

## 2023-08-24 NOTE — PLAN OF CARE
Problem: MOBILITY - ADULT  Goal: Maintain or return to baseline ADL function  Description: INTERVENTIONS:  -  Assess patient's ability to carry out ADLs; assess patient's baseline for ADL function and identify physical deficits which impact ability to perform ADLs (bathing, care of mouth/teeth, toileting, grooming, dressing, etc.)  - Assess/evaluate cause of self-care deficits   - Assess range of motion  - Assess patient's mobility; develop plan if impaired  - Assess patient's need for assistive devices and provide as appropriate  - Encourage maximum independence but intervene and supervise when necessary  - Involve family in performance of ADLs  - Assess for home care needs following discharge   - Consider OT consult to assist with ADL evaluation and planning for discharge  - Provide patient education as appropriate  Outcome: Progressing  Goal: Maintains/Returns to pre admission functional level  Description: INTERVENTIONS:  - Perform BMAT or MOVE assessment daily.   - Set and communicate daily mobility goal to care team and patient/family/caregiver. - Collaborate with rehabilitation services on mobility goals if consulted  - Perform Range of Motion 3 times a day. - Reposition patient every 2 hours.   - Dangle patient 3 times a day  - Stand patient 3 times a day  - Ambulate patient 3 times a day  - Out of bed to chair 3 times a day   - Out of bed for meals 3 times a day  - Out of bed for toileting  - Record patient progress and toleration of activity level   Outcome: Progressing     Problem: PAIN - ADULT  Goal: Verbalizes/displays adequate comfort level or baseline comfort level  Description: Interventions:  - Encourage patient to monitor pain and request assistance  - Assess pain using appropriate pain scale  - Administer analgesics based on type and severity of pain and evaluate response  - Implement non-pharmacological measures as appropriate and evaluate response  - Consider cultural and social influences on pain and pain management  - Notify physician/advanced practitioner if interventions unsuccessful or patient reports new pain  Outcome: Progressing     Problem: INFECTION - ADULT  Goal: Absence or prevention of progression during hospitalization  Description: INTERVENTIONS:  - Assess and monitor for signs and symptoms of infection  - Monitor lab/diagnostic results  - Monitor all insertion sites, i.e. indwelling lines, tubes, and drains  - Monitor endotracheal if appropriate and nasal secretions for changes in amount and color  - [place] appropriate cooling/warming therapies per order  - Administer medications as ordered  - Instruct and encourage patient and family to use good hand hygiene technique  - Identify and instruct in appropriate isolation precautions for identified infection/condition  Outcome: Progressing  Goal: Absence of fever/infection during neutropenic period  Description: INTERVENTIONS:  - Monitor WBC    Outcome: Progressing     Problem: SAFETY ADULT  Goal: Maintain or return to baseline ADL function  Description: INTERVENTIONS:  -  Assess patient's ability to carry out ADLs; assess patient's baseline for ADL function and identify physical deficits which impact ability to perform ADLs (bathing, care of mouth/teeth, toileting, grooming, dressing, etc.)  - Assess/evaluate cause of self-care deficits   - Assess range of motion  - Assess patient's mobility; develop plan if impaired  - Assess patient's need for assistive devices and provide as appropriate  - Encourage maximum independence but intervene and supervise when necessary  - Involve family in performance of ADLs  - Assess for home care needs following discharge   - Consider OT consult to assist with ADL evaluation and planning for discharge  - Provide patient education as appropriate  Outcome: Progressing  Goal: Maintains/Returns to pre admission functional level  Description: INTERVENTIONS:  - Perform BMAT or MOVE assessment daily.   - Set and communicate daily mobility goal to care team and patient/family/caregiver. - Collaborate with rehabilitation services on mobility goals if consulted  - Perform Range of Motion 3 times a day. - Reposition patient every 2 hours.   - Dangle patient 3 times a day  - Stand patient 3 times a day  - Ambulate patient 3 times a day  - Out of bed to chair 3 times a day   - Out of bed for meals 3 times a day  - Out of bed for toileting  - Record patient progress and toleration of activity level   Outcome: Progressing  Goal: Patient will remain free of falls  Description: INTERVENTIONS:  - Educate patient/family on patient safety including physical limitations  - Instruct patient to call for assistance with activity   - Consult OT/PT to assist with strengthening/mobility   - Keep Call bell within reach  - Keep bed low and locked with side rails adjusted as appropriate  - Keep care items and personal belongings within reach  - Initiate and maintain comfort rounds  - Make Fall Risk Sign visible to staff  - Offer Toileting every 2 Hours, in advance of need  - Initiate/Maintain alarm  - Obtain necessary fall risk management equipment:   - Apply yellow socks and bracelet for high fall risk patients  - Consider moving patient to room near nurses station  Outcome: Progressing     Problem: DISCHARGE PLANNING  Goal: Discharge to home or other facility with appropriate resources  Description: INTERVENTIONS:  - Identify barriers to discharge w/patient and caregiver  - Arrange for needed discharge resources and transportation as appropriate  - Identify discharge learning needs (meds, wound care, etc.)  - Arrange for interpretive services to assist at discharge as needed  - Refer to Case Management Department for coordinating discharge planning if the patient needs post-hospital services based on physician/advanced practitioner order or complex needs related to functional status, cognitive ability, or social support system  Outcome: Progressing     Problem: Knowledge Deficit  Goal: Patient/family/caregiver demonstrates understanding of disease process, treatment plan, medications, and discharge instructions  Description: Complete learning assessment and assess knowledge base.   Interventions:  - Provide teaching at level of understanding  - Provide teaching via preferred learning methods  Outcome: Progressing     Problem: Prexisting or High Potential for Compromised Skin Integrity  Goal: Skin integrity is maintained or improved  Description: INTERVENTIONS:  - Identify patients at risk for skin breakdown  - Assess and monitor skin integrity  - Assess and monitor nutrition and hydration status  - Monitor labs   - Assess for incontinence   - Turn and reposition patient  - Assist with mobility/ambulation  - Relieve pressure over bony prominences  - Avoid friction and shearing  - Provide appropriate hygiene as needed including keeping skin clean and dry  - Evaluate need for skin moisturizer/barrier cream  - Collaborate with interdisciplinary team   - Patient/family teaching  - Consider wound care consult   Outcome: Progressing

## 2023-08-25 VITALS
BODY MASS INDEX: 25.2 KG/M2 | TEMPERATURE: 97.4 F | WEIGHT: 133.38 LBS | OXYGEN SATURATION: 93 % | RESPIRATION RATE: 17 BRPM | SYSTOLIC BLOOD PRESSURE: 151 MMHG | HEART RATE: 65 BPM | DIASTOLIC BLOOD PRESSURE: 71 MMHG

## 2023-08-25 LAB
ANION GAP SERPL CALCULATED.3IONS-SCNC: 7 MMOL/L
BUN SERPL-MCNC: 22 MG/DL (ref 5–25)
CALCIUM SERPL-MCNC: 8.3 MG/DL (ref 8.4–10.2)
CHLORIDE SERPL-SCNC: 110 MMOL/L (ref 96–108)
CO2 SERPL-SCNC: 24 MMOL/L (ref 21–32)
CREAT SERPL-MCNC: 0.82 MG/DL (ref 0.6–1.3)
ERYTHROCYTE [DISTWIDTH] IN BLOOD BY AUTOMATED COUNT: 14.2 % (ref 11.6–15.1)
GFR SERPL CREATININE-BSD FRML MDRD: 65 ML/MIN/1.73SQ M
GLUCOSE SERPL-MCNC: 91 MG/DL (ref 65–140)
HCT VFR BLD AUTO: 35.8 % (ref 34.8–46.1)
HGB BLD-MCNC: 11 G/DL (ref 11.5–15.4)
MCH RBC QN AUTO: 31.3 PG (ref 26.8–34.3)
MCHC RBC AUTO-ENTMCNC: 30.7 G/DL (ref 31.4–37.4)
MCV RBC AUTO: 102 FL (ref 82–98)
MRSA NOSE QL CULT: NORMAL
PLATELET # BLD AUTO: 262 THOUSANDS/UL (ref 149–390)
PMV BLD AUTO: 9.8 FL (ref 8.9–12.7)
POTASSIUM SERPL-SCNC: 3.8 MMOL/L (ref 3.5–5.3)
RBC # BLD AUTO: 3.51 MILLION/UL (ref 3.81–5.12)
SODIUM SERPL-SCNC: 141 MMOL/L (ref 135–147)
VIT B12 SERPL-MCNC: 189 PG/ML (ref 180–914)
WBC # BLD AUTO: 6.41 THOUSAND/UL (ref 4.31–10.16)

## 2023-08-25 PROCEDURE — 82607 VITAMIN B-12: CPT

## 2023-08-25 PROCEDURE — 99239 HOSP IP/OBS DSCHRG MGMT >30: CPT

## 2023-08-25 PROCEDURE — 85027 COMPLETE CBC AUTOMATED: CPT

## 2023-08-25 PROCEDURE — 99232 SBSQ HOSP IP/OBS MODERATE 35: CPT

## 2023-08-25 PROCEDURE — 80048 BASIC METABOLIC PNL TOTAL CA: CPT

## 2023-08-25 RX ORDER — QUETIAPINE FUMARATE 25 MG/1
12.5 TABLET, FILM COATED ORAL
Qty: 15 TABLET | Refills: 0 | Status: CANCELLED | OUTPATIENT
Start: 2023-08-25 | End: 2023-09-24

## 2023-08-25 RX ORDER — CEFPODOXIME PROXETIL 200 MG/1
200 TABLET, FILM COATED ORAL 2 TIMES DAILY
Qty: 6 TABLET | Refills: 0 | Status: SHIPPED | OUTPATIENT
Start: 2023-08-25 | End: 2023-08-28

## 2023-08-25 RX ADMIN — ACETAMINOPHEN 975 MG: 325 TABLET ORAL at 12:39

## 2023-08-25 RX ADMIN — LEVOTHYROXINE SODIUM 25 MCG: 25 TABLET ORAL at 06:03

## 2023-08-25 RX ADMIN — CITALOPRAM HYDROBROMIDE 20 MG: 20 TABLET ORAL at 09:51

## 2023-08-25 RX ADMIN — ASPIRIN 81 MG 81 MG: 81 TABLET ORAL at 09:51

## 2023-08-25 RX ADMIN — DICLOFENAC SODIUM 2 G: 10 GEL TOPICAL at 09:51

## 2023-08-25 RX ADMIN — ENOXAPARIN SODIUM 40 MG: 40 INJECTION SUBCUTANEOUS at 09:51

## 2023-08-25 RX ADMIN — ACETAMINOPHEN 325 MG: 325 TABLET ORAL at 06:03

## 2023-08-25 NOTE — PLAN OF CARE
Problem: MOBILITY - ADULT  Goal: Maintain or return to baseline ADL function  Description: INTERVENTIONS:  -  Assess patient's ability to carry out ADLs; assess patient's baseline for ADL function and identify physical deficits which impact ability to perform ADLs (bathing, care of mouth/teeth, toileting, grooming, dressing, etc.)  - Assess/evaluate cause of self-care deficits   - Assess range of motion  - Assess patient's mobility; develop plan if impaired  - Assess patient's need for assistive devices and provide as appropriate  - Encourage maximum independence but intervene and supervise when necessary  - Involve family in performance of ADLs  - Assess for home care needs following discharge   - Consider OT consult to assist with ADL evaluation and planning for discharge  - Provide patient education as appropriate  Outcome: Progressing  Goal: Maintains/Returns to pre admission functional level  Description: INTERVENTIONS:  - Perform BMAT or MOVE assessment daily.   - Set and communicate daily mobility goal to care team and patient/family/caregiver.    - Collaborate with rehabilitation services on mobility goals if consulted  - Out of bed for toileting  - Record patient progress and toleration of activity level   Outcome: Progressing     Problem: PAIN - ADULT  Goal: Verbalizes/displays adequate comfort level or baseline comfort level  Description: Interventions:  - Encourage patient to monitor pain and request assistance  - Assess pain using appropriate pain scale  - Administer analgesics based on type and severity of pain and evaluate response  - Implement non-pharmacological measures as appropriate and evaluate response  - Consider cultural and social influences on pain and pain management  - Notify physician/advanced practitioner if interventions unsuccessful or patient reports new pain  Outcome: Progressing     Problem: INFECTION - ADULT  Goal: Absence or prevention of progression during hospitalization  Description: INTERVENTIONS:  - Assess and monitor for signs and symptoms of infection  - Monitor lab/diagnostic results  - Monitor all insertion sites, i.e. indwelling lines, tubes, and drains  - Monitor endotracheal if appropriate and nasal secretions for changes in amount and color  - [place] appropriate cooling/warming therapies per order  - Administer medications as ordered  - Instruct and encourage patient and family to use good hand hygiene technique  - Identify and instruct in appropriate isolation precautions for identified infection/condition  Outcome: Progressing  Goal: Absence of fever/infection during neutropenic period  Description: INTERVENTIONS:  - Monitor WBC    Outcome: Progressing     Problem: SAFETY ADULT  Goal: Maintain or return to baseline ADL function  Description: INTERVENTIONS:  -  Assess patient's ability to carry out ADLs; assess patient's baseline for ADL function and identify physical deficits which impact ability to perform ADLs (bathing, care of mouth/teeth, toileting, grooming, dressing, etc.)  - Assess/evaluate cause of self-care deficits   - Assess range of motion  - Assess patient's mobility; develop plan if impaired  - Assess patient's need for assistive devices and provide as appropriate  - Encourage maximum independence but intervene and supervise when necessary  - Involve family in performance of ADLs  - Assess for home care needs following discharge   - Consider OT consult to assist with ADL evaluation and planning for discharge  - Provide patient education as appropriate  Outcome: Progressing  Goal: Maintains/Returns to pre admission functional level  Description: INTERVENTIONS:  - Perform BMAT or MOVE assessment daily.   - Set and communicate daily mobility goal to care team and patient/family/caregiver.    - Collaborate with rehabilitation services on mobility goals if consulted  - Out of bed for toileting  - Record patient progress and toleration of activity level   Outcome: Progressing  Goal: Patient will remain free of falls  Description: INTERVENTIONS:  - Educate patient/family on patient safety including physical limitations  - Instruct patient to call for assistance with activity   - Consult OT/PT to assist with strengthening/mobility   - Keep Call bell within reach  - Keep bed low and locked with side rails adjusted as appropriate  - Keep care items and personal belongings within reach  - Initiate and maintain comfort rounds  - Make Fall Risk Sign visible to staff  - Offer Toileting every 2 Hours, in advance of need  - Initiate/Maintain alarm  - Obtain necessary fall risk management equipment:   - Apply yellow socks and bracelet for high fall risk patients  - Consider moving patient to room near nurses station  Outcome: Progressing     Problem: DISCHARGE PLANNING  Goal: Discharge to home or other facility with appropriate resources  Description: INTERVENTIONS:  - Identify barriers to discharge w/patient and caregiver  - Arrange for needed discharge resources and transportation as appropriate  - Identify discharge learning needs (meds, wound care, etc.)  - Arrange for interpretive services to assist at discharge as needed  - Refer to Case Management Department for coordinating discharge planning if the patient needs post-hospital services based on physician/advanced practitioner order or complex needs related to functional status, cognitive ability, or social support system  Outcome: Progressing     Problem: Knowledge Deficit  Goal: Patient/family/caregiver demonstrates understanding of disease process, treatment plan, medications, and discharge instructions  Description: Complete learning assessment and assess knowledge base.   Interventions:  - Provide teaching at level of understanding  - Provide teaching via preferred learning methods  Outcome: Progressing     Problem: Prexisting or High Potential for Compromised Skin Integrity  Goal: Skin integrity is maintained or improved  Description: INTERVENTIONS:  - Identify patients at risk for skin breakdown  - Assess and monitor skin integrity  - Assess and monitor nutrition and hydration status  - Monitor labs   - Assess for incontinence   - Turn and reposition patient  - Assist with mobility/ambulation  - Relieve pressure over bony prominences  - Avoid friction and shearing  - Provide appropriate hygiene as needed including keeping skin clean and dry  - Evaluate need for skin moisturizer/barrier cream  - Collaborate with interdisciplinary team   - Patient/family teaching  - Consider wound care consult   Outcome: Progressing Soft, non-tender, no hepatosplenomegaly, normal bowel sounds

## 2023-08-25 NOTE — PROGRESS NOTES
Progress Note - Geriatric Medicine   Anayeli Krause 81 y.o. female MRN: [id number]  Unit/Bed#: E5 -01 Encounter: [id number]      Assessment/Plan:    Dementia  · Patient with known documented history   · Unclear when she was diagnosed   · Son notes the patient is oriented to person and place at baseline   · She does not have any behaviors at baseline  · She was noted to have behaviors on admission and was started on on Seroquel at bedtime   · Seroquel adjusted to prn at bedtime yesterday   · Nursing notes the patient did require the prn dose last night as she was restless   · She slept well last night after receiving   · Patient is tentatively discharging back to her facility today   · Would not send her on Seroquel as she does well at her facility with no behaviors   · Patient is alert and awake on exam today   · She is oriented only to person   · Most recent TSH on 8/7/2023 noted to be 1.682  · No vitamin B12 level noted in epic   · Consider checking on routine labs  · CT of the head on 8/23/2023 revealed no acute intracranial abnormalities   · No MoCA noted in epic  · Maintain delirium precautions as discussed below   · Redirect and reorient as needed  · Keep physically, mentally, and socially active     Delirium   • Current mentation: alert and oriented to person   • Patient is at high risk secondary to age, dementia, UTI, antibiotic use, fall, and hospitalization   • Maintain delirium precautions   · Provide redirection, reorientation, and distraction techniques  · Maintain fall and safety precautions   · Assist with ADLs/IADLs  · Avoid deliriogenic medications such as tramadol, benzodiazepines, anticholinergics, benadryl  · Treat pain using geriatric pain protocol   · Encourage oral hydration and nutrition   · Monitor for constipation and urinary retention   · Implement sleep hygiene and limit night time interuptions   · Maintain sleep-wake cycle   · Encourage early and frequent mobilization   · Encourage participation in group activities  • Most recent EKG on 8/23/2023 revealed a QTc interval of 438  · If all other interventions are unsuccessful for acute agitation and behaviors, can consider Zyprexa 2.5 mg IM Q 8 hours prn   • Would avoid benzodiazepines such as Ativan as these can worsen delirium     Deconditioning   • Patient is at increased risk for deconditioning secondary to dementia, UTI, weakness, gait dysfunction, and hospitalization   • Continue to optimize diet, hydration, and mobility for healing   · GFR 65 on labs today  · Avoid nephrotoxic medication and renal dose medication  · Keep hydrated   · Infection   · Patient with UA on admission suspicious for UTI  · She is currently on IV Rocephin for this  · Urine culture is currently pending   · No leukocytosis noted on labs today   • Monitor for signs and symptoms of infection, dehydration, DVT, and skin breakdown    Frailty   Clinical Frail Scale: 6- Moderately Frail  · Need help with all outside activities  · Need help with stairs and bathing  · May need assistance with dressing  • Most recent albumin on 8/24/2023 noted to be 3.6  • Consider nutrition consult  • Encourage protein supplementation     Ambulatory Dysfunction/Falls  • Son notes no other recent falls at home   • Patient presented to the hospital after sustaining a fall at her facility   • She typically ambulates with a cane at baseline   • PT/OT consulted to assist with strengthening/mobility and assist with discharge planning to appropriate level of care  • Assess patient frequently for physical needs, encourage use of assistant devices as needed and directed by PT/OT  • Identify cognitive and physical deficits and behaviors that affect risk of falls  • Consider moving patient closer to nursing station to monitor more closely for impulsive behavior which may increase risk of falls  • [place] fall precautions   • Educate patient/family on patient safety including physical limitations and importance of using call bell for assistance   • Modify environment to reduce risk of injury including disconnecting from pole when not in use, ensuring adequate lighting in room and restroom, ensuring that path to restroom is clear and free of trip hazards  • Out of bed as tolerated     Dentition/Appetite   • Patient does not have dentures   · Son admited she will be fitted for these next month   · Patient notes she has a good appetite at baseline   • Ensure meal consistency is appropriate for all abilities   • Facility records indicate that there is an order for ensure when the patient consumes less than 50% of her meals   · Can consider ordering if she is not eating well here   • Consider nutrition consult   • Continue aspiration precautions     Elimination   • Patient is incontinent of bowel and bladder at times at baseline  • Son notes that she does wear pads at baseline   • No bowel movement documented since admission   • She is not currently on a bowel regimen   • Monitor for constipation and urinary retention     Insomnia   • Son is not aware of any difficulty sleeping at baseline   • She is not currently on any medication for sleep at baseline   • She did receive Seroquel 12.5 mg last night at bedtime and slept well last night per staff   • She will likely not need this on discharge as she will be back in a familiar environment and will be able to resume her normal routine   • First line is behavioral therapy   • Avoid sedative hypnotics including benzodiazepines and benadryl  • Encourage staying awake during the day   • Encourage daytime activities and morning exercise   • Decrease or eliminate daytime naps   • Avoid caffeine especially during late afternoon and evening hours  • Establish a nighttime routine  • Implement sleep hygiene and limit nighttime interruptions  • Can consider melatonin 3 mg daily at bedtime for sleep if needed     Anxiety/Depression  • Patient has a documented history of anxiety and depression   • She is currently on citalopram 20 mg daily   • Mood appears stable on exam today   • Continue supportive care     Urinary Tract Infection   · UA on admission suspicious for UTI with small blood, nitrites, and innumerable bacteria   · She was also noted to have leukocytosis on admission with a WBC count of 17.23   · Urine culture revealed > 100,000 gram negative meet   · Leukocytosis has since resolved   · Patient remains on IV Rocephin at this time and will be discharged on a course of oral antibiotics   · Management per primary team       Subjective: The patient is being seen and evaluated today at the bedside for geriatric follow-up. She is noted to be lying in bed comfortably in no acute distress. She is alert and oriented to person on my exam. She is currently denying pain. She states that she ate a little bit of breakfast and slept well last night. Care was coordinated with patients nurse Lidya Levy. She states that the patient did require Seroquel last night as she was restless but slept after that. She notes no acute issues or events this morning. Review of Systems   Unable to perform ROS: Dementia       Objective:     Vitals: Blood pressure 151/71, pulse 65, temperature (!) 97.4 °F (36.3 °C), resp. rate 17, weight 60.5 kg (133 lb 6.1 oz), SpO2 93 %. ,Body mass index is 25.2 kg/m². No intake or output data in the 24 hours ending 08/25/23 1034    Current Medications: Reviewed    Physical Exam:   Physical Exam  Vitals and nursing note reviewed. Constitutional:       General: She is not in acute distress. Appearance: Normal appearance. She is not ill-appearing. HENT:      Head: Normocephalic. Mouth/Throat:      Mouth: Mucous membranes are dry. Eyes:      General: No scleral icterus. Conjunctiva/sclera: Conjunctivae normal.   Cardiovascular:      Rate and Rhythm: Normal rate and regular rhythm.    Pulmonary:      Effort: Pulmonary effort is normal. No respiratory distress. Abdominal:      General: Bowel sounds are normal. There is no distension. Palpations: Abdomen is soft. Tenderness: There is no abdominal tenderness. Musculoskeletal:         General: No swelling or tenderness. Skin:     General: Skin is warm and dry. Neurological:      Mental Status: She is alert. She is disoriented. Comments: Oriented to person   Disoriented to place and time  Hx of dementia           Invasive Devices     Peripheral Intravenous Line  Duration           Peripheral IV 08/23/23 Left;Proximal;Ventral (anterior) Forearm 1 day                Lab, Imaging and other studies: I have personally reviewed pertinent reports. Please note:  Voice-recognition software may have been used in the preparation of this document. Occasional wrong word or "sound-alike" substitutions may have occurred due to the inherent limitations of voice recognition software. Interpretation should be guided by context.

## 2023-08-25 NOTE — ASSESSMENT & PLAN NOTE
· History of dementia [facility]  · Continue Celexa 20 mg once daily  · Received seroquel as needed while inpatient, will continue off at discharge   · Geriatrics consulted appreciated recommendations

## 2023-08-25 NOTE — CASE MANAGEMENT
Case Management Discharge Planning Note    Patient name Meli Schneider  Location East 5 /E5 -* MRN [id number]  : 1935 Date 2023       Current Admission Date: 2023  Current Admission Diagnosis:Fall   Patient Active Problem List    Diagnosis Date Noted   • Hypertension 2023   • Hypothyroidism 2023   • Dementia ([address] ) 2023   • Fall 2023   • UTI (urinary tract infection) 2023   • Elevated troponin 2023   • Blood transfusion declined because patient is Yazidi 2023      LOS (days): 2  Geometric Mean LOS (GMLOS) (days): 2.80  Days to GMLOS:1.1     OBJECTIVE:  Risk of Unplanned Readmission Score: 10.49         Current admission status: Inpatient   Preferred Pharmacy:   [address] [place] [address], [facility] [address] [address]  Phone: [phone] Fax: [phone]    Primary Care Provider: No primary care provider on file. Primary Insurance: 0 [facility] [address] [facility]  Secondary Insurance:     DISCHARGE DETAILS:    Discharge planning discussed with[de-identified] Gwendolyn Avalos ([facility] CM) [phone] [id number]  Freedom of Choice: Yes  Comments - Freedom of Choice: Pt to discharge back to [address] [place] [address] w/ HHPT/OT orders.     Requested [facility]         Is the patient interested in [facility] at discharge?: Yes  [facility] requested[de-identified] Occupational Therapy, Physical [address] Name[de-identified] Other ([facility])  HHA External Referral Reason (only applicable if external HHA name selected): Patient has established relationship with provider  [facility] Provider[de-identified] PCP  Home Health Services Needed[de-identified] Evaluate Functional Status and Safety, Gait/ADL Training, Strengthening/Theraputic Exercises to Improve Function  Homebound Criteria Met[de-identified] Requires the Assistance of Another Person for Safe Ambulation or to Leave the Home, Uses an Assist Device (i.e. cane, walker, etc)  Supporting Clincal Findings[de-identified] Cognitive Deficit Requiring the Assistance of Others, Fatigues Easliy in [employer], Limited Endurance    Discharge Destination Plan[de-identified] Facility Return, Assisted Living  Transport at Discharge : Wheelchair Theresa (v by car w/ Son)    Additional Comments: CM received return call from [employer]  Saqib Al 20-23-41-52. DCP discussed including HHPT/OT which [facility] will provide. Orders requested and completed for SLIM to sign w/ discharge order. CM to further arrange with  Route,25 A [phone]. Pt will transport via car w/ son v [facility]. CM following.

## 2023-08-25 NOTE — ASSESSMENT & PLAN NOTE
· Patient presented due to fall. Underlying dementia patient is poor historian  · CT head negative for acute process, CT C-spine with mild C5 anterior wedge deformity. · ED discussed with trauma, does not require c-collar, or transfer to B.    · CT chest abdomen pelvis without acute process  · Leukocytosis 17 on admission, resolved  · Received 2 days of IV, continue cefpodoxime 200 mg BID for an additional 3 days  · PT and OT recommending home with home health

## 2023-08-25 NOTE — ASSESSMENT & PLAN NOTE
· UA concerning for UTI with hx of falls  · Noted to be foul smelling  · Iv rocephin day 2, continue on cefpoxidime for an additional 3 days   · Urine culture resulting in gram negative rods, will continue to follow after discharge

## 2023-08-25 NOTE — CASE MANAGEMENT
Case Management Discharge Planning Note    Patient name Malaika Yates  Location East 5 /E5 -* MRN [id number]  : 1935 Date 2023       Current Admission Date: 2023  Current Admission Diagnosis:Fall   Patient Active Problem List    Diagnosis Date Noted   • Hypertension 2023   • Hypothyroidism 2023   • Dementia ([address] ) 2023   • Fall 2023   • UTI (urinary tract infection) 2023   • Elevated troponin 2023   • Blood transfusion declined because patient is Mu-ism 2023      LOS (days): 2  Geometric Mean LOS (GMLOS) (days): 2.80  Days to GMLOS:1     OBJECTIVE:  Risk of Unplanned Readmission Score: 13.4         Current admission status: Inpatient   Preferred Pharmacy:   [address] , [address] [address]  Phone: [phone] Fax: [phone]    Primary Care Provider: No primary care provider on file. Primary Insurance: [facility]  Secondary Insurance:     DISCHARGE DETAILS:    Freedom of Choice: Yes  Comments - Freedom of Choice: Pt to discharge back to [address] [place] [address] w/ HHPT/OT orders. Discharge Destination Plan[de-identified] Facility Return, Assisted Living, Home with [facility] at Discharge : Wheelchair van  Dispatcher Contacted: Yes  Number/Name of Dispatcher: Roundtrip  Transported by [employer] and Unit #): [address] of Transport (Date): 23  ETA of Transport (Time): 1616  Transfer Mode: Wheelchair  Accompanied by: EMS personnel    Additional Comments: Pt discharging today and returning to [facility]. Ambulatory HHPT/OT order and AVS with signed scripts faxed to [facility] [phone] and sent with Pt to [address] [place] [address]. Pt transporting via [facility]. [facility] informed of same. CM following through discharge.

## 2023-08-25 NOTE — DISCHARGE SUMMARY
[facility]  Discharge- Atilio Coburn 1935, 81 y.o. female MRN: [id number]  Unit/Bed#: E5 -01 Encounter: [id number]  Primary Care Provider: No primary care provider on file. Date and time admitted to hospital: 8/23/2023 10:17 AM    * Fall  Assessment & Plan  · Patient presented due to fall. Underlying dementia patient is poor historian  · CT head negative for acute process, CT C-spine with mild C5 anterior wedge deformity. · ED discussed with trauma, does not require c-collar, or transfer to [facility]. · CT chest abdomen pelvis without acute process  · Leukocytosis 17 on admission, resolved  · Received 2 days of IV, continue cefpodoxime 200 mg BID for an additional 3 days  · PT and OT recommending home with home health     UTI (urinary tract infection)  Assessment & Plan  · UA concerning for UTI with hx of falls  · Noted to be foul smelling  · Iv rocephin day 2, continue on cefpoxidime for an additional 3 days   · Urine culture resulting in gram negative rods, will continue to follow after discharge     Hypertension  Assessment & Plan  · Not on medications PTA  · Elevated, possibly due to increase agitation and pain after fall upon admission  · Resolved, -150      Elevated troponin  Assessment & Plan  · Elevated troponins, 46, 88  · Denies chest pain  · EKG showing normal sinus rhythm without ischemic changes  · Suspect likely due to recent fall    Dementia ([address])  Assessment & Plan  · History of dementia currently resides at above and beyond  · Continue Celexa 20 mg once daily  · Received seroquel as needed while inpatient, will continue off at discharge   · Geriatrics consulted appreciated recommendations     Hypothyroidism  Assessment & Plan  · Continue levothyroxine      Medical Problems     Resolved Problems  Date Reviewed: 8/25/2023   None       Discharging Physician / Practitioner: Flori Agarwal PA-C  PCP: No primary care provider on file.   Admission Date: Admission Orders (From admission, onward)     Ordered        08/23/23 [address] [place]  Once                      Discharge Date: 08/25/23    Consultations During Hospital Stay:  · geriatrics    Procedures Performed:   · none    Significant Findings / Test Results:   · XR knee left impression no acute osseous abnormality  · XR knee right impression no acute osseous abnormality  · CT head impression no acute intracranial abnormality. Chronic findings  · CT spine cervical impression mild anterior wedge deformity of C5 without other suspicious findings, possibly chronic. Correlate to exclude focal tenderness. Otherwise no fracture suspected. Advanced multilevel degenerative changes. Grade 1 anterolisthesis of C7 on T1, likely degenerative in etiology. ·  CT chest abdomen pelvis impression no acute fracture identified. No acute intrathoracic or intra-abdominal pathology. Mild hypoventilatory changes in the lungs. Chronic findings, as per the body of the report. Incidental Findings:   · none    Test Results Pending at Discharge (will require follow up):   · Urine culture, b12     Outpatient Tests Requested:  · none    Complications:  none    Reason for Admission: [facility] Course:   Magaly Ram is a 81 y.o. female patient who originally presented to the hospital on 8/23/2023 due to unwitnessed fall from above and beyond. Upon presentation patient was unable to answer questions appropriately. CT head, CT abdomen pelvis, x-rays bilateral knees unremarkable for any acute fractures. UA was concerning for UTI. Urine culture resulting in gram-negative rods. Patient was started on IV Rocephin with improvement in mentation. She was seen by geriatrics while inpatient was started on Seroquel as needed. She will continue off of this at discharge this can be restarted in the future if needed for behaviors.  She will continue on oral cefpodoxime 200 mg twice a day for an additional 3 days at discharge for continued treatment of UTI. Urine culture has not fully resulted yet I will follow this over the weekend and send updated antibiotic if this treatment is not appropriate. She was stable for discharge today. And all discharge plans were discussed with her son. She should return to the hospital with any subsequent falls and/or high fevers or urinary complaints. Please see above list of diagnoses and related plan for additional information. Condition at Discharge: stable    Discharge Day Visit / Exam:   Subjective:  Patient was seen laying in bed today. She was very pleasant and less confused. She reported some continued pain in her knees. She denied any other acute complaints   Vitals: Blood Pressure: 151/71 (08/25/23 0735)  Pulse: 65 (08/24/23 0741)  Temperature: (!) 97.4 °F (36.3 °C) (08/25/23 0735)  Temp Source: Oral (08/23/23 1250)  Respirations: 17 (08/25/23 0735)  Weight - Scale: 60.5 kg (133 lb 6.1 oz) (08/23/23 1022)  SpO2: 93 % (08/24/23 0741)  Exam:   Physical Exam  Vitals and nursing note reviewed. Constitutional:       Appearance: Normal appearance. HENT:      Head: Normocephalic and atraumatic. Eyes:      General: No scleral icterus. Cardiovascular:      Rate and Rhythm: Normal rate and regular rhythm. Pulmonary:      Effort: Pulmonary effort is normal.      Breath sounds: Normal breath sounds. Abdominal:      General: Abdomen is flat. Bowel sounds are normal.      Palpations: Abdomen is soft. Musculoskeletal:      Right lower leg: No edema. Left lower leg: No edema. Skin:     General: Skin is warm and dry. Neurological:      Mental Status: She is alert. Mental status is at baseline. Comments: Oriented to person, knew we were in a hospital but not which one   Psychiatric:         Mood and Affect: Mood normal.         Behavior: Behavior normal.        Discussion with Family: Updated  (son) via phone.     Discharge instructions/Information to patient and family:   See after visit summary for information provided to patient and family. Provisions for Follow-Up Care:  See after visit summary for information related to follow-up care and any pertinent home health orders. Disposition:   Assisted Living Facility at [facility]    Planned Readmission: none     Discharge Statement:  I spent 60 minutes discharging the patient. This time was spent on the day of discharge. I had direct contact with the patient on the day of discharge. Greater than 50% of the total time was spent examining patient, answering all patient questions, arranging and discussing plan of care with patient as well as directly providing post-discharge instructions. Additional time then spent on discharge activities. Discharge Medications:  See after visit summary for reconciled discharge medications provided to patient and/or family.       **Please Note: This note may have been constructed using a voice recognition system**

## 2023-08-25 NOTE — DISCHARGE INSTR - AVS FIRST PAGE
Dear Any Parks,     It was our pleasure to care for you here at [facility], [facility]. It is our hope that we were always able to exceed the expected standards for your care during your stay. You were hospitalized due to UTI and fall . You were cared for on the 5th floor by Caitlyn Diaz PA-C under the service of [address] [place] [address] with the [facility] who covers for your primary care physician (PCP), No primary care provider on file. , while you were hospitalized. If you have any questions or concerns related to this hospitalization, you may contact us at [phone]. For follow up as well as any medication refills, we recommend that you follow up with your primary care physician. A registered nurse will reach out to you by phone within a few days after your discharge to answer any additional questions that you may have after going home. However, at this time we provide for you here, the most important instructions / recommendations at discharge:     Notable Medication Adjustments -   Cotinue on cefpodxime 200 mg BID for an additional 3 days  Continue on voltaren gel to help with knee pain   Testing Required after Discharge -   none  ** Please contact your PCP to request testing orders for any of the testing recommended here **  Important follow up information -   Follow up with your family doctor  Please review this entire after visit summary as additional general instructions including medication list, appointments, activity, diet, any pertinent wound care, and other additional recommendations from your care team that may be provided for you.       Sincerely,     Caitlyn Diaz PA-C

## 2023-08-26 LAB
BACTERIA UR CULT: ABNORMAL
BACTERIA UR CULT: ABNORMAL

## 2023-08-26 NOTE — UTILIZATION REVIEW
Notification of Unplanned, Urgent, or   Emergency Inpatient Admission   [address]  [facility], [facility]  Tax ID: [id number]  NPI: [id number]  [facility] [address] [facility] [address] [facility] [address]  Admission Level of Care: Inpatient  Place of Service Code: 24     Attending Physician Information  Attending Name and NPI#: [id number] Levy, [address] [[id number]]  Phone: [phone]     Admission Information  Inpatient Admission Date/Time: 8/23/23  3:59 PM  Discharge Date/Time: 8/25/2023 12:48 PM  Admitting Diagnosis Code/Description:  UTI (urinary tract infection) [N39.0]  Pain [R52]  Dementia ([address]) [F03.90]  Bilateral knee pain [M25.561, M25.562]  Abnormal CT scan, cervical spine [R93.7]     Utilization Review Contact  Liliana Jones Utilization   Phone: [phone]  Fax: [phone]  Email: [email or web address]  Contact for approvals/pending authorizations, clinical reviews, and discharge. Physician Advisory Services Contact  Medical Necessity Denial & Hkji-mh-Zuyi Discussion  Phone: [phone]  Fax: [phone]  Email: [email or web address] Paul Levy PA-C  Physician Assistant  Hospitalist  Discharge Summary     Attested  Date of Service:  8/25/2023 11:34 AM     Attested            Attestation signed by Fidel Levy MD at 8/25/2023  6:06 PM         I was the assigned collaborating physician on 8/25/23. I was available by phone, if needed, for consultation.     Fidel Levy MD 08/25/23              [address] [facility]  Discharge- Alex Palomo 1935, 81 y.o. female MRN: [id number]  Unit/Bed#: E5 -01 Encounter: [id number]  Primary Care Provider: No primary care provider on file. Date and time admitted to hospital: 8/23/2023 10:17 AM     * Fall  Assessment & Plan  • Patient presented due to fall. Underlying dementia patient is poor historian  • CT head negative for acute process, CT C-spine with mild C5 anterior wedge deformity. • ED discussed with trauma, does not require c-collar, or transfer to [facility]. • CT chest abdomen pelvis without acute process  • Leukocytosis 17 on admission, resolved  • Received 2 days of IV, continue cefpodoxime 200 mg BID for an additional 3 days  • PT and OT recommending home with home health      UTI (urinary tract infection)  Assessment & Plan  • UA concerning for UTI with hx of falls  • Noted to be foul smelling  • Iv rocephin day 2, continue on cefpoxidime for an additional 3 days   • Urine culture resulting in gram negative rods, will continue to follow after discharge      Hypertension  Assessment & Plan  • Not on medications PTA  • Elevated, possibly due to increase agitation and pain after fall upon admission  • Resolved, -150        Elevated troponin  Assessment & Plan  • Elevated troponins, 46, 88  • Denies chest pain  • EKG showing normal sinus rhythm without ischemic changes  • Suspect likely due to recent fall     Dementia ([address])  Assessment & Plan  • History of dementia currently resides at above and beyond  • Continue Celexa 20 mg once daily  • Received seroquel as needed while inpatient, will continue off at discharge   • Geriatrics consulted appreciated recommendations      Hypothyroidism  Assessment & Plan  • Continue levothyroxine            Medical Problems      Resolved Problems  Date Reviewed: 8/25/2023   None         Discharging Physician / Practitioner: Shaina Kay PA-C  PCP: No primary care provider on file.   Admission Date:       Admission Orders (From admission, onward)       Ordered         08/23/23 1559   INPATIENT ADMISSION  Once                         Discharge Date: 08/25/23     Consultations During Hospital Stay:  • geriatrics     Procedures Performed:   • none     Significant Findings / Test Results:   • XR knee left impression no acute osseous abnormality  • XR knee right impression no acute osseous abnormality  • CT head impression no acute intracranial abnormality. Chronic findings  • CT spine cervical impression mild anterior wedge deformity of C5 without other suspicious findings, possibly chronic. Correlate to exclude focal tenderness. Otherwise no fracture suspected. Advanced multilevel degenerative changes. Grade 1 anterolisthesis of C7 on T1, likely degenerative in etiology. •  CT chest abdomen pelvis impression no acute fracture identified. No acute intrathoracic or intra-abdominal pathology. Mild hypoventilatory changes in the lungs. Chronic findings, as per the body of the report.     Incidental Findings:   • none     Test Results Pending at Discharge (will require follow up):   • Urine culture, b12     Outpatient Tests Requested:  • none     Complications:  none     Reason for Admission: [facility] Course:   Ivon Adame is a 81 y.o. female patient who originally presented to the hospital on 8/23/2023 due to unwitnessed fall from above and beyond. Upon presentation patient was unable to answer questions appropriately. CT head, CT abdomen pelvis, x-rays bilateral knees unremarkable for any acute fractures. UA was concerning for UTI. Urine culture resulting in gram-negative rods. Patient was started on IV Rocephin with improvement in mentation. She was seen by geriatrics while inpatient was started on Seroquel as needed. She will continue off of this at discharge this can be restarted in the future if needed for behaviors. She will continue on oral cefpodoxime 200 mg twice a day for an additional 3 days at discharge for continued treatment of UTI. Urine culture has not fully resulted yet I will follow this over the weekend and send updated antibiotic if this treatment is not appropriate. She was stable for discharge today. And all discharge plans were discussed with her son.   She should return to the hospital with any subsequent falls and/or high fevers or urinary complaints.        Please see above list of diagnoses and related plan for additional information.      Condition at Discharge: stable     Discharge Day Visit / Exam:   Subjective:  Patient was seen Cristi Segura in bed today. She was very pleasant and less confused. She reported some continued pain in her knees. She denied any other acute complaints   Vitals: Blood Pressure: 151/71 (08/25/23 0735)  Pulse: 65 (08/24/23 0741)  Temperature: (!) 97.4 °F (36.3 °C) (08/25/23 0735)  Temp Source: Oral (08/23/23 1250)  Respirations: 17 (08/25/23 0735)  Weight - Scale: 60.5 kg (133 lb 6.1 oz) (08/23/23 1022)  SpO2: 93 % (08/24/23 0741)  Exam:   Physical Exam  Vitals and nursing note reviewed. Constitutional:       Appearance: Normal appearance. HENT:      Head: Normocephalic and atraumatic. Eyes:      General: No scleral icterus. Cardiovascular:      Rate and Rhythm: Normal rate and regular rhythm. Pulmonary:      Effort: Pulmonary effort is normal.      Breath sounds: Normal breath sounds. Abdominal:      General: Abdomen is flat. Bowel sounds are normal.      Palpations: Abdomen is soft. Musculoskeletal:      Right lower leg: No edema. Left lower leg: No edema. Skin:     General: Skin is warm and dry. Neurological:      Mental Status: She is alert. Mental status is at baseline. Comments: Oriented to person, knew we were in a hospital but not which one   Psychiatric:         Mood and Affect: Mood normal.         Behavior: Behavior normal.         Discussion with Family: Updated  (son) via phone.     Discharge instructions/Information to patient and family:   See after visit summary for information provided to patient and family.       Provisions for Follow-Up Care:  See after visit summary for information related to follow-up care and any pertinent home health orders.        Disposition:   Assisted Living Facility at [facility] care     Planned Readmission: none     Discharge Statement:  I spent 60 minutes discharging the patient. This time was spent on the day of discharge. I had direct contact with the patient on the day of discharge. Greater than 50% of the total time was spent examining patient, answering all patient questions, arranging and discussing plan of care with patient as well as directly providing post-discharge instructions.   Additional time then spent on discharge activities.     Discharge Medications:  See after visit summary for reconciled discharge medications provided to patient and/or family.       **Please Note: This note may have been constructed using a voice recognition system**               Cosigned by: Mayuri Johansen MD at 8/25/2023  6:06 PM     Revision History

## 2023-08-28 NOTE — QUICK NOTE
Reviewed patient's final urine culture susceptible to ceftriaxone, prescribed antibiotic regimen appropriately treated UTI.